# Patient Record
Sex: MALE | Race: ASIAN | NOT HISPANIC OR LATINO | Employment: FULL TIME | URBAN - METROPOLITAN AREA
[De-identification: names, ages, dates, MRNs, and addresses within clinical notes are randomized per-mention and may not be internally consistent; named-entity substitution may affect disease eponyms.]

---

## 2018-08-15 ENCOUNTER — TRANSCRIBE ORDERS (OUTPATIENT)
Dept: ADMINISTRATIVE | Facility: HOSPITAL | Age: 53
End: 2018-08-15

## 2018-08-15 DIAGNOSIS — Z01.818 PREOP TESTING: ICD-10-CM

## 2018-08-15 DIAGNOSIS — R52 PAIN: Primary | ICD-10-CM

## 2018-08-21 ENCOUNTER — HOSPITAL ENCOUNTER (OUTPATIENT)
Dept: RADIOLOGY | Facility: HOSPITAL | Age: 53
Discharge: HOME | End: 2018-08-21
Attending: SPECIALIST
Payer: COMMERCIAL

## 2018-08-21 DIAGNOSIS — R52 PAIN: ICD-10-CM

## 2018-08-21 PROCEDURE — 74177 CT ABD & PELVIS W/CONTRAST: CPT

## 2018-08-21 RX ADMIN — IOHEXOL 100 ML: 350 INJECTION, SOLUTION INTRAVENOUS at 07:42

## 2018-08-24 RX ORDER — AMLODIPINE BESYLATE 5 MG/1
5 TABLET ORAL
COMMUNITY

## 2018-08-24 NOTE — PRE-PROCEDURE INSTRUCTIONS
Pre-Surgery Instructions:   Medication Instructions    amLODIPine (NORVASC) 5 mg tablet Patient was instructed by Physician and understands   Multiple Vitamins-Minerals (CENTRUM PO) Patient was instructed by Physician and understands   Multiple Vitamins-Minerals (MENS 50+ ADVANCED PO) Patient was instructed by Physician and understands  Pt to follow Dr Minh Velásquez instructions    daughter Robina Kevin [phone]

## 2018-08-27 ENCOUNTER — ANESTHESIA EVENT (OUTPATIENT)
Dept: GASTROENTEROLOGY | Facility: AMBULARY SURGERY CENTER | Age: 53
End: 2018-08-27
Payer: COMMERCIAL

## 2018-08-28 ENCOUNTER — HOSPITAL ENCOUNTER (OUTPATIENT)
Facility: AMBULARY SURGERY CENTER | Age: 53
Setting detail: OUTPATIENT SURGERY
Discharge: HOME | End: 2018-08-28
Attending: SPECIALIST | Admitting: SPECIALIST
Payer: COMMERCIAL

## 2018-08-28 ENCOUNTER — ANESTHESIA (OUTPATIENT)
Dept: GASTROENTEROLOGY | Facility: AMBULARY SURGERY CENTER | Age: 53
End: 2018-08-28
Payer: COMMERCIAL

## 2018-08-28 VITALS
WEIGHT: 178 LBS | RESPIRATION RATE: 18 BRPM | TEMPERATURE: 98.4 F | HEART RATE: 72 BPM | OXYGEN SATURATION: 93 % | SYSTOLIC BLOOD PRESSURE: 102 MMHG | BODY MASS INDEX: 30.39 KG/M2 | DIASTOLIC BLOOD PRESSURE: 64 MMHG | HEIGHT: 64 IN

## 2018-08-28 DIAGNOSIS — K31.9 DISEASE OF STOMACH AND DUODENUM: ICD-10-CM

## 2018-08-28 DIAGNOSIS — D37.1 NEOPLASM OF UNCERTAIN BEHAVIOR OF STOMACH: ICD-10-CM

## 2018-08-28 DIAGNOSIS — K31.9 LESION OF STOMACH: Primary | ICD-10-CM

## 2018-08-28 PROCEDURE — 88342 IMHCHEM/IMCYTCHM 1ST ANTB: CPT | Performed by: PATHOLOGY

## 2018-08-28 PROCEDURE — 88341 IMHCHEM/IMCYTCHM EA ADD ANTB: CPT | Performed by: PATHOLOGY

## 2018-08-28 PROCEDURE — 88305 TISSUE EXAM BY PATHOLOGIST: CPT | Performed by: PATHOLOGY

## 2018-08-28 PROCEDURE — 88313 SPECIAL STAINS GROUP 2: CPT | Performed by: PATHOLOGY

## 2018-08-28 RX ORDER — NICOTINE 21 MG/24HR
1 PATCH, TRANSDERMAL 24 HOURS TRANSDERMAL DAILY
Qty: 28 PATCH | Refills: 0 | Status: SHIPPED | OUTPATIENT
Start: 2018-08-28

## 2018-08-28 RX ORDER — PROPOFOL 10 MG/ML
INJECTION, EMULSION INTRAVENOUS AS NEEDED
Status: DISCONTINUED | OUTPATIENT
Start: 2018-08-28 | End: 2018-08-28 | Stop reason: SURG

## 2018-08-28 RX ORDER — NICOTINE 21 MG/24HR
21 PATCH, TRANSDERMAL 24 HOURS TRANSDERMAL DAILY
Status: DISCONTINUED | OUTPATIENT
Start: 2018-08-28 | End: 2018-08-28 | Stop reason: HOSPADM

## 2018-08-28 RX ORDER — SODIUM CHLORIDE 9 MG/ML
125 INJECTION, SOLUTION INTRAVENOUS CONTINUOUS
Status: DISCONTINUED | OUTPATIENT
Start: 2018-08-28 | End: 2018-08-28 | Stop reason: HOSPADM

## 2018-08-28 RX ORDER — PANTOPRAZOLE SODIUM 40 MG/1
40 TABLET, DELAYED RELEASE ORAL DAILY
Qty: 21 TABLET | Refills: 0 | Status: SHIPPED | OUTPATIENT
Start: 2018-08-28 | End: 2021-04-08

## 2018-08-28 RX ADMIN — PROPOFOL 50 MG: 10 INJECTION, EMULSION INTRAVENOUS at 09:47

## 2018-08-28 RX ADMIN — PROPOFOL 50 MG: 10 INJECTION, EMULSION INTRAVENOUS at 10:00

## 2018-08-28 RX ADMIN — PROPOFOL 50 MG: 10 INJECTION, EMULSION INTRAVENOUS at 09:43

## 2018-08-28 RX ADMIN — PROPOFOL 20 MG: 10 INJECTION, EMULSION INTRAVENOUS at 09:44

## 2018-08-28 RX ADMIN — PROPOFOL 50 MG: 10 INJECTION, EMULSION INTRAVENOUS at 09:50

## 2018-08-28 RX ADMIN — PROPOFOL 50 MG: 10 INJECTION, EMULSION INTRAVENOUS at 09:55

## 2018-08-28 RX ADMIN — LIDOCAINE HYDROCHLORIDE 80 MG: 20 INJECTION, SOLUTION INTRAVENOUS at 09:42

## 2018-08-28 RX ADMIN — PROPOFOL 100 MG: 10 INJECTION, EMULSION INTRAVENOUS at 09:42

## 2018-08-28 RX ADMIN — SODIUM CHLORIDE 125 ML/HR: 0.9 INJECTION, SOLUTION INTRAVENOUS at 09:14

## 2018-08-28 RX ADMIN — PROPOFOL 50 MG: 10 INJECTION, EMULSION INTRAVENOUS at 10:08

## 2018-08-28 NOTE — DISCHARGE INSTRUCTIONS
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]     * Peptic ulcer disease [K27 9]     * Hiatal hernia [K44 9]  Procedure(s):  ESOPHAGOGASTRODUODENOSCOPY (EGD)  Surgeon(s):  Laura Telles MD    1  Diet:  · Begin with liquids and light food (Jell-O, soup, etc) Progress slowly to your normal diet  No alcoholic beverages for 24 hours  · Resume your normal diet  2  Medications:  · Home medications reviewed  Resume pre-operative medications unless noted below  · Prescription sent home with patient and Prescription sent over to pharmacy  · See after visit summary for reconciled discharge medications provided to patient and family  3  Activities:  · Do NOT make important personal or business decisions for 24 hours  · Do NOT drive or operate machinery for 24 hours  · While taking pain medication, do not drive and be careful as you walk or climb stairs  · Limit your activities for 24 hours  Do not engage in sports, heavy work or heavy  lifting until your physician gives you permission  4  When to Call:       Call if fever, Nausea, vomiting, Constipation not feeling well, or bleeding per rectum        and excessive pain,  5  Follow-up Care:  · Make an appointment to see MD Bayron Rivera FACS  in 10 days for Follow up   Please Call Office   for appointment,   · Call if fever, Nausea, vomiting, Constipation not feeling well, orbleeding and excessive pain,    Laura Telles MD MS [facility]  08/28/18  10:19 AM

## 2018-08-28 NOTE — ANESTHESIA PREPROCEDURE EVALUATION
Review of Systems/Medical History  Patient summary reviewed  Chart reviewed  No history of anesthetic complications     Cardiovascular  Exercise tolerance (METS): >4,  Hypertension ,    Pulmonary       GI/Hepatic            Endo/Other     GYN       Hematology   Musculoskeletal       Neurology   Psychology           Physical Exam    Airway    Mallampati score: II  TM Distance: >3 FB  Neck ROM: full     Dental   No notable dental hx     Cardiovascular  Cardiovascular exam normal    Pulmonary  Pulmonary exam normal     Other Findings        Anesthesia Plan  ASA Score- 2     Anesthesia Type- IV sedation with anesthesia with ASA Monitors  Additional Monitors:   Airway Plan:         Plan Factors-    Induction-     Postoperative Plan-     Informed Consent- Anesthetic plan and risks discussed with patient  I personally reviewed this patient with the CRNA  Discussed and agreed on the Anesthesia Plan with the CRNA  Adelaida Myers

## 2018-08-28 NOTE — PERIOPERATIVE NURSING NOTE
Patient had a delayed discharge due to low BP additional bag of NS 500cc was given  BP up to 102/64  Patient not complaining of any dizziness  No gag reflex yet  RN instructed patient to hold off on eating and drinking for another hour  RN instructed patient to start off with small sips of water and to sit up at 90 degree angle when swallowing  RN instructed patient to call MD for any signs of fever in the next couple days  Patient agrees with instructions

## 2018-08-28 NOTE — OP NOTE
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]     * Peptic ulcer disease [K27 9]     * Hiatal hernia [K44 9]  Patient Active Problem List   Diagnosis    Lesion of stomach     PMH:  Past Medical History:   Diagnosis Date    Mass of abdomen     right       Procedure(s):  ESOPHAGOGASTRODUODENOSCOPY (EGD)    Surgeon(s):  Sherryle Cipro, MD    Pre-Procedure Physical:  The following portions of the patient's history were reviewed and updated as appropriate: allergies, current medications, past family history, past medical history, past social history, past surgical history and problem list     /74   Pulse 87   Temp 98 4 °F (36 9 °C) (Tympanic)   Resp 18   Ht 5' 4" (1 626 m)   Wt 80 7 kg (178 lb)   SpO2 95%   BMI 30 55 kg/m²     Airway:  normal  Heart:  normal S1 and S2  Lungs:  clear  Abdomen:  soft, nontender, normal bowel sounds  Mental Status:  awake and alert; oriented to person, place, and time    Procedure:   Endoscopic Gastroduodenoscopy --diagnostic stomach mass on the CT scan of abdominal pelvis    Indications: New-onset dyspepsia   Stomach mass    Sedation: Monitored anesthesia care    Staff:   Circulator: Usha Whitmore RN  Scrub Person: Pamella Hatchet    Operative Findings: Body of stomach some ulceration      OPERATIVE TECHNIQUE    Fernando Beasley was identified by me  Proper detailed consent was obtained from patient risk and benefits were explained to patient and family in detail prior to coming to Operating Room  Site was marked        Malik Reason was given pre-operative antibiotics  Body mass index is 30 55 kg/m²  Malik Reason is ASA 2 and Fire risk- 2  Malik Reason was re-identified in the OR  Site was identified and detailed timeout was performed with Anesthesia and OR staff  During this procedure anesthesia was given by the anesthesiologist and during this procedure patient was  monitored continuously with ECG tracing, pulse oximetry, blood pressure monitoring, and direct observation  Malik Reason was placed in the left lateral position after induction of anesthesia mouth block was placed well-lubricated fiberoptic Olympus the gastro-endoscopy was introduced under direct vision through the pharynx to esophagus  Under direct vision scope was then advanced through the esophagus to the stomach multiple pictures were taken pylorus was identified which was opened patulous  Redness and ulcer was noted around the body of the stomach  The scope was then advanced to the second part of the duodenum It was unremarkable  Multiple biopsies were taken from the ulcer site  All the air was sucked out and the scope was then withdrawn into the stomach the rest of the stomach was fully visualized scope was then retroflexed and fundus of the stomach was visualized GE junction was inspected a small hiatal hernia was noted    Biopsy of antral was taken  for pathology and the H  Pylori  All the air was sucked out and scope was then withdrawn gastroesophageal junction was visualized which appeared normal multiple pictures were taken esophagus was then inspected for any Schatzki's ring or abnormal peristalsis   Scope was gradually withdrawn all the air was sucked out    Fernando Beasley tolerated procedure well and remained stable during and after procedure   I was present for the entire procedure    Patient Disposition:  PACU           Sherryle Cipro, MD MS [facility]    Date: 8/28/2018  Time: 10:13 AM    Copy to PCP: Nancy Fuentes MD

## 2018-09-13 ENCOUNTER — OFFICE VISIT (OUTPATIENT)
Dept: LAB | Facility: HOSPITAL | Age: 53
End: 2018-09-13
Attending: SPECIALIST
Payer: COMMERCIAL

## 2018-09-13 ENCOUNTER — APPOINTMENT (OUTPATIENT)
Dept: PREADMISSION TESTING | Facility: HOSPITAL | Age: 53
End: 2018-09-13
Payer: COMMERCIAL

## 2018-09-13 DIAGNOSIS — Z01.818 PREOP TESTING: ICD-10-CM

## 2018-09-13 PROCEDURE — 93005 ELECTROCARDIOGRAM TRACING: CPT

## 2018-09-13 RX ORDER — CLARITHROMYCIN 500 MG/1
500 TABLET, COATED ORAL EVERY 12 HOURS SCHEDULED
COMMUNITY
End: 2022-02-10 | Stop reason: ALTCHOICE

## 2018-09-13 RX ORDER — METRONIDAZOLE 500 MG/1
500 TABLET ORAL EVERY 12 HOURS SCHEDULED
COMMUNITY

## 2018-09-13 RX ORDER — AMOXICILLIN 500 MG/1
500 TABLET, FILM COATED ORAL 2 TIMES DAILY
COMMUNITY
End: 2022-02-10 | Stop reason: ALTCHOICE

## 2018-09-13 NOTE — PRE-PROCEDURE INSTRUCTIONS
My Surgical Experience    The following information was developed to assist you to prepare for your operation  What do I need to do before coming to the hospital?   Arrange for a responsible person to drive you to and from the hospital    Arrange care for your children at home  Children are not allowed in the recovery areas of the hospital   Plan to wear clothing that is easy to put on and take off  If you are having shoulder surgery, wear a shirt that buttons or zippers in the front  Bathing  o Shower the evening before and the morning of your surgery with an antibacterial soap  Please refer to the Pre Op Showering Instructions for Surgery Patients Sheet   o Remove nail polish and all body piercing jewelry  o Do not shave any body part for at least 24 hours before surgery-this includes face, arms, legs and upper body  Food  o Nothing to eat or drink after midnight the night before your surgery  This includes candy and chewing gum  o Exception: If your surgery is after 12:00pm (noon), you may have clear liquids such as 7-Up®, ginger ale, apple or cranberry juice, Jell-O®, water, or clear broth until 8:00 am  o Do not drink milk or juice with pulp on the morning before surgery  o Do not drink alcohol 24 hours before surgery  Medicine  o Follow instructions you received from your surgeon about which medicines you may take on the day of surgery  o If instructed to take medicine on the morning of surgery, take pills with just a small sip of water  Call your prescribing doctor for specific infroamtion on what to do if you take insulin    What should I bring to the hospital?    Bring:  Penjonas Olp or a walker, if you have them, for foot or knee surgery   A list of the daily medicines, vitamins, minerals, herbals and nutritional supplements you take   Include the dosages of medicines and the time you take them each day   Glasses, dentures or hearing aids   Minimal clothing; you will be wearing hospital sleepwear   Photo ID; required to verify your identity   If you have a Living Will or Power of , bring a copy of the documents   If you have an ostomy, bring an extra pouch and any supplies you use    Do not bring   Medicines or inhalers   Money, valuables or jewelry    What other information should I know about the day of surgery?  Notify your surgeons if you develop a cold, sore throat, cough, fever, rash or any other illness   Report to the Ambulatory Surgical/Same Day Surgery Unit   You will be instructed to stop at Registration only if you have not been pre-registered   Inform your  fi they do not stay that they will be asked by the staff to leave a phone number where they can be reached   Be available to be reached before surgery  In the event the operating room schedule changes, you may be asked to come in earlier or later than expected    *It is important to tell your doctor and others involved in your health care if you are taking or have been taking any non-prescription drugs, vitamins, minerals, herbals or other nutritional supplements  Any of these may interact with some food or medicines and cause a reaction      Pre-Surgery Instructions:   Medication Instructions    amLODIPine (NORVASC) 5 mg tablet Instructed patient per Anesthesia Guidelines   amoxicillin (AMOXIL) 500 MG tablet Instructed patient per Anesthesia Guidelines   clarithromycin (BIAXIN) 500 mg tablet Instructed patient per Anesthesia Guidelines   metroNIDAZOLE (FLAGYL) 500 mg tablet Instructed patient per Anesthesia Guidelines   Multiple Vitamins-Minerals (CENTRUM PO) Instructed patient per Anesthesia Guidelines   nicotine (NICODERM CQ) 21 mg/24 hr TD 24 hr patch Instructed patient per Anesthesia Guidelines   pantoprazole (PROTONIX) 40 mg tablet Instructed patient per Anesthesia Guidelines  To take   norvasc and pantoprazole a m  Of surgery

## 2018-09-14 LAB
ATRIAL RATE: 87 BPM
P AXIS: 55 DEGREES
PR INTERVAL: 152 MS
QRS AXIS: 49 DEGREES
QRSD INTERVAL: 72 MS
QT INTERVAL: 350 MS
QTC INTERVAL: 421 MS
T WAVE AXIS: 19 DEGREES
VENTRICULAR RATE: 87 BPM

## 2018-09-14 PROCEDURE — 93010 ELECTROCARDIOGRAM REPORT: CPT | Performed by: INTERNAL MEDICINE

## 2018-09-17 ENCOUNTER — ANESTHESIA EVENT (OUTPATIENT)
Dept: PERIOP | Facility: HOSPITAL | Age: 53
End: 2018-09-17
Payer: COMMERCIAL

## 2018-09-18 ENCOUNTER — HOSPITAL ENCOUNTER (OUTPATIENT)
Facility: HOSPITAL | Age: 53
Setting detail: OUTPATIENT SURGERY
Discharge: HOME | End: 2018-09-18
Attending: SPECIALIST | Admitting: SPECIALIST
Payer: COMMERCIAL

## 2018-09-18 ENCOUNTER — ANESTHESIA (OUTPATIENT)
Dept: PERIOP | Facility: HOSPITAL | Age: 53
End: 2018-09-18
Payer: COMMERCIAL

## 2018-09-18 VITALS
SYSTOLIC BLOOD PRESSURE: 111 MMHG | RESPIRATION RATE: 20 BRPM | TEMPERATURE: 98.1 F | HEART RATE: 86 BPM | DIASTOLIC BLOOD PRESSURE: 78 MMHG | OXYGEN SATURATION: 94 %

## 2018-09-18 DIAGNOSIS — R19.03 RIGHT LOWER QUADRANT ABDOMINAL SWELLING, MASS AND LUMP: ICD-10-CM

## 2018-09-18 DIAGNOSIS — D17.1 LIPOMA OF ABDOMINAL WALL: ICD-10-CM

## 2018-09-18 DIAGNOSIS — K43.2 VENTRAL INCISIONAL HERNIA: Primary | ICD-10-CM

## 2018-09-18 PROCEDURE — C1776 JOINT DEVICE (IMPLANTABLE): HCPCS | Performed by: SPECIALIST

## 2018-09-18 PROCEDURE — 88304 TISSUE EXAM BY PATHOLOGIST: CPT | Performed by: PATHOLOGY

## 2018-09-18 DEVICE — POLYPROPYLENE NON-ABSORBABLE SYNTHETIC SURGICAL MESH
Type: IMPLANTABLE DEVICE | Site: GROIN | Status: FUNCTIONAL
Brand: PROLENE

## 2018-09-18 RX ORDER — NICOTINE 21 MG/24HR
14 PATCH, TRANSDERMAL 24 HOURS TRANSDERMAL DAILY
Status: DISCONTINUED | OUTPATIENT
Start: 2018-09-18 | End: 2018-09-18 | Stop reason: HOSPADM

## 2018-09-18 RX ORDER — MAGNESIUM HYDROXIDE 1200 MG/15ML
LIQUID ORAL AS NEEDED
Status: DISCONTINUED | OUTPATIENT
Start: 2018-09-18 | End: 2018-09-18 | Stop reason: HOSPADM

## 2018-09-18 RX ORDER — MIDAZOLAM HYDROCHLORIDE 1 MG/ML
INJECTION INTRAMUSCULAR; INTRAVENOUS AS NEEDED
Status: DISCONTINUED | OUTPATIENT
Start: 2018-09-18 | End: 2018-09-18 | Stop reason: SURG

## 2018-09-18 RX ORDER — BUPIVACAINE HYDROCHLORIDE AND EPINEPHRINE 5; 5 MG/ML; UG/ML
INJECTION, SOLUTION EPIDURAL; INTRACAUDAL; PERINEURAL AS NEEDED
Status: DISCONTINUED | OUTPATIENT
Start: 2018-09-18 | End: 2018-09-18 | Stop reason: HOSPADM

## 2018-09-18 RX ORDER — OXYCODONE HYDROCHLORIDE AND ACETAMINOPHEN 5; 325 MG/1; MG/1
1 TABLET ORAL EVERY 4 HOURS PRN
Qty: 16 TABLET | Refills: 0 | Status: SHIPPED | OUTPATIENT
Start: 2018-09-18 | End: 2018-09-22

## 2018-09-18 RX ORDER — ONDANSETRON 2 MG/ML
INJECTION INTRAMUSCULAR; INTRAVENOUS AS NEEDED
Status: DISCONTINUED | OUTPATIENT
Start: 2018-09-18 | End: 2018-09-18 | Stop reason: SURG

## 2018-09-18 RX ORDER — LIDOCAINE HYDROCHLORIDE 10 MG/ML
INJECTION, SOLUTION INFILTRATION; PERINEURAL AS NEEDED
Status: DISCONTINUED | OUTPATIENT
Start: 2018-09-18 | End: 2018-09-18 | Stop reason: SURG

## 2018-09-18 RX ORDER — ONDANSETRON 2 MG/ML
4 INJECTION INTRAMUSCULAR; INTRAVENOUS ONCE AS NEEDED
Status: DISCONTINUED | OUTPATIENT
Start: 2018-09-18 | End: 2018-09-18 | Stop reason: HOSPADM

## 2018-09-18 RX ORDER — PROPOFOL 10 MG/ML
INJECTION, EMULSION INTRAVENOUS AS NEEDED
Status: DISCONTINUED | OUTPATIENT
Start: 2018-09-18 | End: 2018-09-18 | Stop reason: SURG

## 2018-09-18 RX ORDER — FENTANYL CITRATE 50 UG/ML
INJECTION, SOLUTION INTRAMUSCULAR; INTRAVENOUS AS NEEDED
Status: DISCONTINUED | OUTPATIENT
Start: 2018-09-18 | End: 2018-09-18 | Stop reason: SURG

## 2018-09-18 RX ORDER — ROCURONIUM BROMIDE 10 MG/ML
INJECTION, SOLUTION INTRAVENOUS AS NEEDED
Status: DISCONTINUED | OUTPATIENT
Start: 2018-09-18 | End: 2018-09-18 | Stop reason: SURG

## 2018-09-18 RX ORDER — SODIUM CHLORIDE, SODIUM LACTATE, POTASSIUM CHLORIDE, CALCIUM CHLORIDE 600; 310; 30; 20 MG/100ML; MG/100ML; MG/100ML; MG/100ML
75 INJECTION, SOLUTION INTRAVENOUS CONTINUOUS
Status: DISCONTINUED | OUTPATIENT
Start: 2018-09-18 | End: 2018-09-18 | Stop reason: SDUPTHER

## 2018-09-18 RX ORDER — EPHEDRINE SULFATE 50 MG/ML
INJECTION, SOLUTION INTRAVENOUS AS NEEDED
Status: DISCONTINUED | OUTPATIENT
Start: 2018-09-18 | End: 2018-09-18 | Stop reason: SURG

## 2018-09-18 RX ORDER — SODIUM CHLORIDE, SODIUM LACTATE, POTASSIUM CHLORIDE, CALCIUM CHLORIDE 600; 310; 30; 20 MG/100ML; MG/100ML; MG/100ML; MG/100ML
75 INJECTION, SOLUTION INTRAVENOUS CONTINUOUS
Status: DISCONTINUED | OUTPATIENT
Start: 2018-09-18 | End: 2018-09-18 | Stop reason: HOSPADM

## 2018-09-18 RX ORDER — DEXAMETHASONE SODIUM PHOSPHATE 4 MG/ML
INJECTION, SOLUTION INTRA-ARTICULAR; INTRALESIONAL; INTRAMUSCULAR; INTRAVENOUS; SOFT TISSUE AS NEEDED
Status: DISCONTINUED | OUTPATIENT
Start: 2018-09-18 | End: 2018-09-18 | Stop reason: SURG

## 2018-09-18 RX ORDER — FENTANYL CITRATE/PF 50 MCG/ML
25 SYRINGE (ML) INJECTION
Status: DISCONTINUED | OUTPATIENT
Start: 2018-09-18 | End: 2018-09-18 | Stop reason: HOSPADM

## 2018-09-18 RX ORDER — GLYCOPYRROLATE 0.2 MG/ML
INJECTION INTRAMUSCULAR; INTRAVENOUS AS NEEDED
Status: DISCONTINUED | OUTPATIENT
Start: 2018-09-18 | End: 2018-09-18 | Stop reason: SURG

## 2018-09-18 RX ORDER — OXYCODONE HYDROCHLORIDE AND ACETAMINOPHEN 5; 325 MG/1; MG/1
1 TABLET ORAL EVERY 4 HOURS PRN
Status: DISCONTINUED | OUTPATIENT
Start: 2018-09-18 | End: 2018-09-18 | Stop reason: HOSPADM

## 2018-09-18 RX ADMIN — FENTANYL CITRATE 25 MCG: 50 INJECTION, SOLUTION INTRAMUSCULAR; INTRAVENOUS at 12:40

## 2018-09-18 RX ADMIN — GLYCOPYRROLATE 0.4 MG: 0.2 INJECTION, SOLUTION INTRAMUSCULAR; INTRAVENOUS at 11:25

## 2018-09-18 RX ADMIN — MIDAZOLAM HYDROCHLORIDE 2 MG: 1 INJECTION, SOLUTION INTRAMUSCULAR; INTRAVENOUS at 10:22

## 2018-09-18 RX ADMIN — OXYCODONE HYDROCHLORIDE AND ACETAMINOPHEN 1 TABLET: 5; 325 TABLET ORAL at 12:55

## 2018-09-18 RX ADMIN — SODIUM CHLORIDE, SODIUM LACTATE, POTASSIUM CHLORIDE, AND CALCIUM CHLORIDE: .6; .31; .03; .02 INJECTION, SOLUTION INTRAVENOUS at 10:00

## 2018-09-18 RX ADMIN — ROCURONIUM BROMIDE 40 MG: 10 INJECTION INTRAVENOUS at 10:30

## 2018-09-18 RX ADMIN — PROPOFOL 150 MG: 10 INJECTION, EMULSION INTRAVENOUS at 10:30

## 2018-09-18 RX ADMIN — SODIUM CHLORIDE, SODIUM LACTATE, POTASSIUM CHLORIDE, AND CALCIUM CHLORIDE 75 ML/HR: .6; .31; .03; .02 INJECTION, SOLUTION INTRAVENOUS at 09:20

## 2018-09-18 RX ADMIN — DEXAMETHASONE SODIUM PHOSPHATE 8 MG: 4 INJECTION, SOLUTION INTRA-ARTICULAR; INTRALESIONAL; INTRAMUSCULAR; INTRAVENOUS; SOFT TISSUE at 10:48

## 2018-09-18 RX ADMIN — EPHEDRINE SULFATE 15 MG: 50 INJECTION, SOLUTION INTRAMUSCULAR; INTRAVENOUS; SUBCUTANEOUS at 10:40

## 2018-09-18 RX ADMIN — PROPOFOL 50 MG: 10 INJECTION, EMULSION INTRAVENOUS at 11:20

## 2018-09-18 RX ADMIN — LIDOCAINE HYDROCHLORIDE 50 MG: 10 INJECTION, SOLUTION INFILTRATION; PERINEURAL at 10:30

## 2018-09-18 RX ADMIN — PROPOFOL 50 MG: 10 INJECTION, EMULSION INTRAVENOUS at 10:55

## 2018-09-18 RX ADMIN — NEOSTIGMINE METHYLSULFATE 3 MG: 1 INJECTION, SOLUTION INTRAMUSCULAR; INTRAVENOUS; SUBCUTANEOUS at 11:25

## 2018-09-18 RX ADMIN — ONDANSETRON 4 MG: 2 INJECTION INTRAMUSCULAR; INTRAVENOUS at 10:48

## 2018-09-18 RX ADMIN — CEFAZOLIN SODIUM 2000 MG: 2 SOLUTION INTRAVENOUS at 10:27

## 2018-09-18 RX ADMIN — FENTANYL CITRATE 100 MCG: 50 INJECTION, SOLUTION INTRAMUSCULAR; INTRAVENOUS at 10:30

## 2018-09-18 RX ADMIN — FENTANYL CITRATE 25 MCG: 50 INJECTION, SOLUTION INTRAMUSCULAR; INTRAVENOUS at 12:24

## 2018-09-18 NOTE — ANESTHESIA POSTPROCEDURE EVALUATION
Post-Op Assessment Note      CV Status:  Stable    Mental Status:  Alert and awake    Hydration Status:  Stable    PONV Controlled:  Controlled    Airway Patency:  Patent and adequate    Post Op Vitals Reviewed: Yes          Staff: CRNA           BP      Temp      Pulse     Resp      SpO2

## 2018-09-18 NOTE — ANESTHESIA PREPROCEDURE EVALUATION
Review of Systems/Medical History  Patient summary reviewed  Chart reviewed      Cardiovascular  Hypertension ,    Pulmonary  Smoker cigarette smoker  ,        GI/Hepatic    GERD ,        Negative  ROS        Endo/Other  Negative endo/other ROS      GYN       Hematology  Negative hematology ROS      Musculoskeletal  Negative musculoskeletal ROS        Neurology  Negative neurology ROS      Psychology           Physical Exam    Airway    Mallampati score: II  TM Distance: >3 FB  Neck ROM: full     Dental       Cardiovascular  Rhythm: regular, Rate: normal,     Pulmonary  Breath sounds clear to auscultation,     Other Findings        Anesthesia Plan  ASA Score- 2     Anesthesia Type- general with ASA Monitors  Additional Monitors:   Airway Plan: LMA  Plan Factors-    Induction- intravenous  Postoperative Plan- Plan for postoperative opioid use  Planned trial extubation    Informed Consent- Anesthetic plan and risks discussed with patient  I personally reviewed this patient with the CRNA  Discussed and agreed on the Anesthesia Plan with the CRNA  [place] Macias

## 2018-09-18 NOTE — ANESTHESIA POSTPROCEDURE EVALUATION
Post-Op Assessment Note      CV Status:  Stable    Mental Status:  Alert and awake    Hydration Status:  Euvolemic    PONV Controlled:  Controlled    Airway Patency:  Patent    Post Op Vitals Reviewed: Yes          Staff: Anesthesiologist           BP (P) 115/67 (09/18/18 1150)    Temp (P) 98 2 °F (36 8 °C) (09/18/18 1150)    Pulse (P) 95 (09/18/18 1150)   Resp (P) 18 (09/18/18 1150)    SpO2 (P) 98 % (09/18/18 1150)

## 2018-09-18 NOTE — PERIOPERATIVE NURSING NOTE
OOB to bathroom, voided large amount clear urine without difficulty  Pain tolerable at 3/10  Criteria met for discharge  Dressing on abdomen dry and intact

## 2018-09-18 NOTE — DISCHARGE INSTRUCTIONS
Incisional Hernia   WHAT YOU NEED TO KNOW:   What is an incisional hernia? An incisional hernia is a bulge through the healed incision of a previous surgery in your abdomen  An incisional hernia is usually caused by weakness in the tissues and muscles of your abdomen  The bulge is usually caused by a part of your intestine, but it may also be tissue or fat pushing through the weakness  What increases my risk for an incisional hernia? · Older age    · Obesity or poor nutrition    · Pregnancy    · Previous infection at the incision site    · Smoking    · Straining during bowel movements, coughing, or heavy lifting    · Medicines, such as steroids  What are the signs and symptoms of an incisional hernia? · Pain    · Nausea or vomiting    · Swelling or a soft bulge along your old incision    · Bloating  How is an incisional hernia diagnosed? Your healthcare provider will look and feel for a bulge in your incision  He may ask you to lie down with your legs bent  He may also ask you to cough while standing up  You may need a CT scan to show the hernia  You may be asked to drink contrast liquid to help the hernia show up better in the pictures  Tell the healthcare provider if you have ever had an allergic reaction to contrast liquid  How is an incisional hernia treated? · Medicines:      ¨ NSAIDs , such as ibuprofen, help decrease swelling, pain, and fever  This medicine is available with or without a doctor's order  NSAIDs can cause stomach bleeding or kidney problems in certain people  If you take blood thinner medicine, always ask your healthcare provider if NSAIDs are safe for you  Always read the medicine label and follow directions  ¨ Prescription pain medicine  may be given  Ask your how to take this medicine safely  · A hernia reduction  is when your healthcare provider pushes the hernia back into your body without surgery   You may be given medicine to relax your muscles to make it easier to push the bulge back in  · Surgery  may be needed to repair your hernia  Surgery may be done through a few small incisions in your abdomen or one larger incision  Mesh may be placed to strengthen your abdominal tissues  How can I help prevent another incisional hernia? · Maintain a healthy weight  Ask your healthcare provider how much you should weigh  Ask him to help you create a weight loss plan if you are overweight  Ask your healthcare provider what types of food you should eat  · Do not strain  when you have a bowel movement  Take an over-the-counter bowel movement softener and drink plenty of water  When you cough, hold a pillow against your incision to prevent strain  · Do not smoke  If you smoke, it is never too late to quit  Ask for information if you need help quitting  · Wear your support device as directed  You may need to wear a support device, such as an abdominal binder for up to 2 weeks after surgery  This helps decrease pain and the risk of fluid collecting under your skin  · Return to your usual activities as directed  Do not lift more than 10 pounds or do strenuous activity for up to 6 weeks  Call 911 for any of the following:   · You have sudden trouble breathing  When should I seek immediate care? · You have severe pain  · You have bloody bowel movements  · You stop having bowel movements or passing gas  · Your abdomen is suddenly very hard  When should I contact my healthcare provider? · You have a fever  · You have nausea and are vomiting  · You are constipated  · Your hernia has returned  · You have a lump, or collection of fluid, under your skin  · You have pain that does not go away, even after you take pain medicine  · You have questions or concerns about your condition or care  CARE AGREEMENT:   You have the right to help plan your care  Learn about your health condition and how it may be treated   Discuss treatment options with your caregivers to decide what care you want to receive  You always have the right to refuse treatment  The above information is an  only  It is not intended as medical advice for individual conditions or treatments  Talk to your doctor, nurse or pharmacist before following any medical regimen to see if it is safe and effective for you  © 2017 [address] Information is for End User's use only and may not be sold, redistributed or otherwise used for commercial purposes  All illustrations and images included in CareNotes® are the copyrighted property of [employer]  or [employer]  Lipoma   WHAT YOU SHOULD KNOW:   A lipoma is a lump made up of fat cells  It is most often found just under your skin on your shoulders, back, or neck, but can be found in other areas of your body  Multiple lipomas found in different areas of your body is called lipomatosis  Lipomas normally grow very slowly and rarely turn into cancer  INSTRUCTIONS:   Follow up with your primary healthcare provider as directed: If you had your lipoma removed, you may need to return to have your wound checked or stitches removed  Write down your questions so you remember to ask them during your visits  Contact your primary healthcare provider if:   · You have blood in your bowel movement  · Your lipoma increases in size  · Your lipoma is painful or you have pain in the area of your lipoma  · You have questions or concerns about your condition or care  Return to the emergency department if:   · You feel a lump in your throat or have trouble swallowing  · You suddenly have trouble breathing  © 2014 [address] is for End User's use only and may not be sold, redistributed or otherwise used for commercial purposes  All illustrations and images included in CareNotes® are the copyrighted property of [employer]  or [employer]    The above information is an  only  It is not intended as medical advice for individual conditions or treatments  Talk to your doctor, nurse or pharmacist before following any medical regimen to see if it is safe and effective for you  SAME DAY SURGERY POST-OPERATIVE INSTRUCTIONS    Please follow carefully all instructions checked below  Fernando S Gale  Pre-op Diagnosis:   Right lower quadrant abdominal swelling, mass and lump [R19 03]  Post-op Diagnosis:  * No post-op diagnosis entered *   Post-Op Diagnosis Codes:     * Right lower quadrant abdominal swelling, mass and lump [R19 03]  Procedure(s):  EXPLORATION OF WOUND, EXCISION OF LIPOMA R   HERNIA REPAIR OF RIGHT GROIN WITH MESH  Surgeon(s):  MD Rui Roe PA-C    1  Diet:  · Begin with liquids and light food (Jell-O, soup, etc) Progress slowly to your normal diet  No alcoholic beverages for 24 hours  · Resume your normal diet  2  Medications:  · Home medications reviewed  Resume pre-operative medications unless noted below  · Prescription sent home with patient and Prescription sent over to pharmacy  · See after visit summary for reconciled discharge medications provided to patient and family  3  Activities:  · Do NOT make important personal or business decisions for 24 hours  · Do NOT drive or operate machinery for 24 hours  · While taking pain medication, do not drive and be careful as you walk or climb stairs  · Limit your activities for 24 hours  Do not engage in sports, heavy work or heavy  lifting until your physician gives you permission  4  When to Call:       Call if fever, Nausea, vomiting, Constipation not feeling well, or bleeding per rectum        and excessive pain,  5  Follow-up Care:  · Make an appointment to see MD Bayron Roe  [facility]  in 10 days for Follow up   Please Call Office  [phone] for appointment,   · Call if fever, Nausea, vomiting, Constipation not feeling well, orbleeding and excessive pain,    Tejal Calzada MD MS FRCS FACS  09/18/18  11:31 AM

## 2018-09-21 NOTE — OP NOTE
General SurgeryEXPLORATION OF WOUND, EXCISION OF LIPOMA R   HERNIA REPAIR OF RIGHT GROIN WITH MESH Op Note    Lea Lovett  9/18/2018    Pre-op Diagnosis:   Right lower quadrant abdominal swelling, mass and lump [R19 03]  PMH:  Past Medical History:   Diagnosis Date    Cellulitis     infection on abd x 2 mos    GERD (gastroesophageal reflux disease)     Hypertension     Mass of abdomen     right       Post-op Diagnosis:  Post-Op Diagnosis Codes:     * Right lower quadrant abdominal swelling, mass and lump [R19 03]    Patient Active Problem List   Diagnosis    Lesion of stomach    Ventral incisional hernia    Lipoma of abdominal wall    Right lower quadrant abdominal swelling, mass and lump       Procedure(s):  EXPLORATION OF WOUND, EXCISION OF LIPOMA R   HERNIA REPAIR OF RIGHT GROIN WITH MESH    Surgeon(s):  MD Anne-Marie Cota PA-C    Anesthesia:  General    Staff:   Circulator: Monse Rowley RN; Celestina Amor RN  Scrub Person: Kana Moya RN    Assistant:  Mr Vicenta Hogan PA-C  Services off [employer]UD[employer] was utilized as a first assistant as there is no surgical residency program at [facility]    Operative Findings: The small hernia direct  A small lipoma    OPERATIVE TECHNIQUE    Fernando Beasley was identified by me  Proper detailed consent was obtained from patient, The risks, benefits, alternatives,and probabilities of success were discussed in detail with no guarantee made as to outcome  All questions were answered to the patient's satisfaction  risk and benefits were explained to patient and family in detail prior to coming to Operating Room  Site was marked  Lea Lovett was given pre-operative antibiotics  There is no height or weight on file to calculate BMI  Lea Lovett is ASA 2 and Fire risk- 3  Lea Lovett was re-identified in the OR  Site was identified and detailed timeout was performed with Anesthesia and OR staff      Lea Lovett was placed in supine position  Operative area was exposed and painted with the ChloraPrep prepped and draped in the usual sterile fashion  An incision was made his previous right McBurney's incision his appendectomy scar incision was made The skin and subcutaneous tissue was cut along the line of incision with scalpel  Scar tissue was excised external oblique was identified  Superficial lipoma was noted was excised and sent for pathology    On the defect was identified at the site of for pain and tenderness and palpable lump secondary to direct inguinal hernia this area was covered with the Prolene mesh 3 x 2 cm Prolene mesh was cut which was placed under the external oblique  External beam was approximated with the 0 Vicryl Sutures  operative hemostasis was achieved the skin scar was excised completely and closed in 2 layers subcutaneous breast tissue was approximated with Vicryl   The skin was approximated with subcuticular Monocryl  Area was thoroughly infiltrated with Marcaine with epinephrine  A sterile dressing was placed    Fernando Beasley tolerated the procedure well, remain stable during and after the procedure  At the end of the procedure No active bleeding was noted and all the instruments, needle and sponge counts were noted to be correct  Sterile dresing was applied and patient was transfered to recovery area in stable condition  I was present for the entire procedure No qualified resident was available  A physician's assistant was required due to the intensity of the surgery  This no residency program in this hospital no resident was available to assist  Physician assistant was required for camera operation, hemostasis retraction and the closure of the surgical wound   Physician assistant was present during the entire procedure    Estimated Blood Loss:  Minimal    Specimens:    Order Name Source Comment Collection Info Order Time   TISSUE EXAM Soft Tissue, Lipoma  Collected By: Samia oTdd MD 9/18/2018 11:10 AM         Drains:   nil    Complications:  None    Total OR Time  1 Hr 62 Min 57 Sec   Procedure  Time  Event Time In   Procedure Start 1046   Procedure Closing 0647   Procedure Finish 9210         Stephen Lombardo MD MS Feroz Mcleod    Date: 9/21/2018  Time: 8:34 AM    Copy to PCP: Sanjuana Roberts MD

## 2019-09-10 ENCOUNTER — APPOINTMENT (OUTPATIENT)
Dept: RADIOLOGY | Facility: CLINIC | Age: 54
End: 2019-09-10

## 2019-09-10 ENCOUNTER — TRANSCRIBE ORDERS (OUTPATIENT)
Dept: URGENT CARE | Facility: CLINIC | Age: 54
End: 2019-09-10

## 2019-09-10 DIAGNOSIS — Z02.1 ENCOUNTER FOR PRE-EMPLOYMENT EXAMINATION: ICD-10-CM

## 2019-09-10 DIAGNOSIS — Z02.1 ENCOUNTER FOR PRE-EMPLOYMENT EXAMINATION: Primary | ICD-10-CM

## 2019-09-10 PROCEDURE — 71045 X-RAY EXAM CHEST 1 VIEW: CPT

## 2019-12-31 ENCOUNTER — TRANSCRIBE ORDERS (OUTPATIENT)
Dept: ADMINISTRATIVE | Facility: HOSPITAL | Age: 54
End: 2019-12-31

## 2019-12-31 DIAGNOSIS — E04.1 THYROID NODULE: Primary | ICD-10-CM

## 2020-01-03 ENCOUNTER — HOSPITAL ENCOUNTER (OUTPATIENT)
Dept: RADIOLOGY | Facility: HOSPITAL | Age: 55
Discharge: HOME | End: 2020-01-03
Attending: INTERNAL MEDICINE
Payer: COMMERCIAL

## 2020-01-03 DIAGNOSIS — E04.1 THYROID NODULE: ICD-10-CM

## 2020-01-03 PROCEDURE — 76536 US EXAM OF HEAD AND NECK: CPT

## 2020-04-08 ENCOUNTER — NURSE TRIAGE (OUTPATIENT)
Dept: OBGYN CLINIC | Facility: HOSPITAL | Age: 55
End: 2020-04-08

## 2020-04-08 DIAGNOSIS — Z20.828 SARS-ASSOCIATED CORONAVIRUS EXPOSURE: Primary | ICD-10-CM

## 2020-04-08 DIAGNOSIS — Z20.828 SARS-ASSOCIATED CORONAVIRUS EXPOSURE: ICD-10-CM

## 2020-04-08 PROCEDURE — 87635 SARS-COV-2 COVID-19 AMP PRB: CPT

## 2020-04-11 LAB — SARS-COV-2 RNA SPEC QL NAA+PROBE: DETECTED

## 2020-04-13 ENCOUNTER — TELEMEDICINE (OUTPATIENT)
Dept: FAMILY MEDICINE CLINIC | Facility: OTHER | Age: 55
End: 2020-04-13
Payer: COMMERCIAL

## 2020-04-13 DIAGNOSIS — J06.9 ACUTE RESPIRATORY DISEASE DUE TO COVID-19 VIRUS: ICD-10-CM

## 2020-04-13 DIAGNOSIS — Z20.828 EXPOSURE TO SARS-ASSOCIATED CORONAVIRUS: Primary | ICD-10-CM

## 2020-04-13 DIAGNOSIS — U07.1 ACUTE RESPIRATORY DISEASE DUE TO COVID-19 VIRUS: ICD-10-CM

## 2020-04-13 PROCEDURE — 99213 OFFICE O/P EST LOW 20 MIN: CPT | Performed by: FAMILY MEDICINE

## 2020-11-25 PROCEDURE — U0003 INFECTIOUS AGENT DETECTION BY NUCLEIC ACID (DNA OR RNA); SEVERE ACUTE RESPIRATORY SYNDROME CORONAVIRUS 2 (SARS-COV-2) (CORONAVIRUS DISEASE [COVID-19]), AMPLIFIED PROBE TECHNIQUE, MAKING USE OF HIGH THROUGHPUT TECHNOLOGIES AS DESCRIBED BY CMS-2020-01-R: HCPCS | Performed by: INTERNAL MEDICINE

## 2021-04-08 DIAGNOSIS — K31.9 LESION OF STOMACH: ICD-10-CM

## 2021-04-08 RX ORDER — PANTOPRAZOLE SODIUM 40 MG/1
TABLET, DELAYED RELEASE ORAL
Qty: 90 TABLET | Refills: 3 | Status: SHIPPED | OUTPATIENT
Start: 2021-04-08 | End: 2021-07-06

## 2021-07-03 DIAGNOSIS — K31.9 LESION OF STOMACH: ICD-10-CM

## 2021-07-06 RX ORDER — PANTOPRAZOLE SODIUM 40 MG/1
TABLET, DELAYED RELEASE ORAL
Qty: 90 TABLET | Refills: 3 | Status: SHIPPED | OUTPATIENT
Start: 2021-07-06 | End: 2021-12-16 | Stop reason: SDUPTHER

## 2021-12-16 DIAGNOSIS — K31.9 LESION OF STOMACH: ICD-10-CM

## 2021-12-17 ENCOUNTER — IMMUNIZATIONS (OUTPATIENT)
Dept: FAMILY MEDICINE CLINIC | Facility: HOSPITAL | Age: 56
End: 2021-12-17

## 2021-12-17 DIAGNOSIS — Z23 ENCOUNTER FOR IMMUNIZATION: Primary | ICD-10-CM

## 2021-12-17 PROCEDURE — 0001A COVID-19 PFIZER VACC 0.3 ML: CPT

## 2021-12-17 PROCEDURE — 91300 COVID-19 PFIZER VACC 0.3 ML: CPT

## 2021-12-17 RX ORDER — PANTOPRAZOLE SODIUM 40 MG/1
40 TABLET, DELAYED RELEASE ORAL DAILY
Qty: 90 TABLET | Refills: 2 | Status: SHIPPED | OUTPATIENT
Start: 2021-12-17

## 2022-02-10 ENCOUNTER — OFFICE VISIT (OUTPATIENT)
Dept: GASTROENTEROLOGY | Facility: CLINIC | Age: 57
End: 2022-02-10
Payer: COMMERCIAL

## 2022-02-10 VITALS
BODY MASS INDEX: 28.77 KG/M2 | HEIGHT: 66 IN | WEIGHT: 179 LBS | DIASTOLIC BLOOD PRESSURE: 88 MMHG | SYSTOLIC BLOOD PRESSURE: 138 MMHG | HEART RATE: 71 BPM

## 2022-02-10 DIAGNOSIS — Z86.010 HISTORY OF COLON POLYPS: Primary | ICD-10-CM

## 2022-02-10 DIAGNOSIS — Z72.0 NICOTINE ABUSE: ICD-10-CM

## 2022-02-10 PROCEDURE — 99214 OFFICE O/P EST MOD 30 MIN: CPT | Performed by: INTERNAL MEDICINE

## 2022-02-10 PROCEDURE — 3008F BODY MASS INDEX DOCD: CPT | Performed by: INTERNAL MEDICINE

## 2022-02-10 RX ORDER — SILDENAFIL 100 MG/1
100 TABLET, FILM COATED ORAL
COMMUNITY
Start: 2022-01-24

## 2022-02-10 NOTE — PROGRESS NOTES
[facility]patient [facility]  Yudelka Murphy 61 y o  male MRN: [id number]  Encounter: [id number]          ASSESSMENT AND PLAN:      1  History of colon polyps  Patient is doing well  He is asymptomatic  He had colonoscopy a year ago which revealed a hyperplastic polyp  He has no GI symptoms such as constipation or diarrhea  Denies any rectal bleeding or mucus per rectum  He does not have any nausea or vomiting  He came here because he was asked to come for a follow-up  He does not have any issues with his health  Recently saw his primary care specialist   There has been no change in his medications at this time  2  Nicotine abuse  He smokes about 10 cigarettes a day  We had a long discussion regarding his smoking  I have advised him to quit  He will try to do so  Adverse effects of nicotine abuse have been explained been thoroughly  He understands  I have advised him to either take maker red or contact primary care for Chantix     ______________________________________________________________________    SUBJECTIVE:  Denies any abdominal pain or discomfort  There is no nausea or vomiting  Denies any chest pain, cough or wheezing he denies any dysuria hematuria  There is no prior history of stroke, CVA or seizures  There is no significant symptom present at this time  REVIEW OF SYSTEMS IS OTHERWISE NEGATIVE  Historical Information   Past Medical History:   Diagnosis Date    Cellulitis     infection on abd x 2 mos    GERD (gastroesophageal reflux disease)     Hypertension     Mass of abdomen     right     Past Surgical History:   Procedure Laterality Date    APPENDECTOMY  2008    CYST REMOVAL      chest    MA ESOPHAGOGASTRODUODENOSCOPY TRANSORAL DIAGNOSTIC N/A 8/28/2018    Procedure: ESOPHAGOGASTRODUODENOSCOPY (EGD); Surgeon: Abbi Osullivan MD;  [facility] GI [facility];   Service: General    MA EXC TUMOR SOFT TISSUE ABDL WALL SUBFASCIAL <5CM Right 9/18/2018    Procedure: EXPLORATION OF WOUND, EXCISION OF LIPOMA R   HERNIA REPAIR OF RIGHT GROIN WITH MESH;  Surgeon: Kiesha Minor MD;  Location: [facility];  Service: General     Social History   Social History     Substance and Sexual Activity   Alcohol Use No     Social History     Substance and Sexual Activity   Drug Use No     Social History     Tobacco Use   Smoking Status Current Some Day Smoker    Packs/day: 0 50    Years: 25 00    Pack years: 12 50    Types: Cigarettes   Smokeless Tobacco Never Used     Family History   Problem Relation Age of Onset    Stroke Mother        Meds/Allergies       Current Outpatient Medications:     amLODIPine (NORVASC) 5 mg tablet    Multiple Vitamins-Minerals (MENS 50+ ADVANCED PO)    pantoprazole (PROTONIX) 40 mg tablet    metroNIDAZOLE (FLAGYL) 500 mg tablet    nicotine (NICODERM CQ) 21 mg/24 hr TD 24 hr patch    sildenafil (VIAGRA) 100 mg tablet    No Known Allergies        Objective     Blood pressure 138/88, pulse 71, height 5' 6" (1 676 m), weight 81 2 kg (179 lb)  Body mass index is 28 89 kg/m²  PHYSICAL EXAM:      General Appearance:   Alert, cooperative, no distress   HEENT:   Normocephalic, atraumatic, anicteric      Neck:  Supple, symmetrical, trachea midline   Lungs:   Clear to auscultation bilaterally; no rales, rhonchi or wheezing; respirations unlabored    Heart[de-identified]   Regular rate and rhythm; no murmur, rub, or gallop  Abdomen:   Soft, non-tender, non-distended; normal bowel sounds; no masses, no organomegaly    Genitalia:   Deferred    Rectal:   Deferred    Extremities:  No cyanosis, clubbing or edema    Pulses:  2+ and symmetric    Skin:  No jaundice, rashes, or lesions    Lymph nodes:  No palpable cervical lymphadenopathy        Lab Results:   No visits with results within 1 Day(s) from this visit     Latest known visit with results is:   Community Orders on 11/24/2020   Component Date Value    SARS-CoV-2  11/25/2020 Not Detected Radiology Results:   No results found

## 2022-09-07 DIAGNOSIS — K31.9 LESION OF STOMACH: ICD-10-CM

## 2022-09-07 RX ORDER — PANTOPRAZOLE SODIUM 40 MG/1
TABLET, DELAYED RELEASE ORAL
Qty: 90 TABLET | Refills: 2 | Status: SHIPPED | OUTPATIENT
Start: 2022-09-07

## 2022-10-04 DIAGNOSIS — N52.9 ERECTILE DYSFUNCTION, UNSPECIFIED ERECTILE DYSFUNCTION TYPE: Primary | ICD-10-CM

## 2022-10-04 RX ORDER — SILDENAFIL 100 MG/1
TABLET, FILM COATED ORAL
Qty: 12 TABLET | Refills: 0 | Status: SHIPPED | OUTPATIENT
Start: 2022-10-04

## 2022-10-13 ENCOUNTER — TELEPHONE (OUTPATIENT)
Dept: FAMILY MEDICINE CLINIC | Facility: CLINIC | Age: 57
End: 2022-10-13

## 2022-12-08 ENCOUNTER — OFFICE VISIT (OUTPATIENT)
Dept: URGENT CARE | Facility: CLINIC | Age: 57
End: 2022-12-08

## 2022-12-08 VITALS
TEMPERATURE: 96.5 F | OXYGEN SATURATION: 97 % | HEIGHT: 66 IN | RESPIRATION RATE: 16 BRPM | BODY MASS INDEX: 28.89 KG/M2 | HEART RATE: 105 BPM

## 2022-12-08 DIAGNOSIS — R05.1 ACUTE COUGH: Primary | ICD-10-CM

## 2022-12-08 NOTE — PROGRESS NOTES
[employer] [facility]        NAME: Trent Lester is a 61 y o  male  : 1965    MRN: [id number]  DATE: 2022  TIME: 11:35 AM    Assessment and Plan   Acute cough [R05 1]  1  Acute cough  Cov/Flu-Collected at [facility] or [facility]      Pt presents with symptoms consistent with possible COVID or flu  Swab performed today  Pt instructed [facility]  Discussed quarantine procedures if results are positive  Recommend Coricidin HBP over the counter for cough as patient has history of hypertension  Follow-up with PCP in 3-5 days if testing is negative and symptoms persist  Report to the emergency department if any chest pain or shortness of breath  Patient Instructions     Patient Instructions   Check or sign up for [facility] my [facility] to view your results  We do not call patient's with negative results  Go directly home after today's visit, quarantine until you receive a negative result  Isolate from others as much as possible until your result comes back  If you have a positive COVID result you need to quarantine at home for a minimum of 5 days from the date your symptoms started  You may end your quarantine when you are fever free without medications to reduce fever (e g  acetaminophen/Tylenol) for 24 hours  Anyone whom you have been in close regular contact (unmasked > 15 minute intervals) since you began having symptoms should be tested within 5-7 days of your positive test regardless of vaccination status or symptoms  Masks should be worn at all times whenever indoors until 10 days after your exposure or positive result  If you have a positive flu result you need to quarantine at home for 5 days from the date your symptoms started  You may end your quarantine when fever free and off fever lowering medications for 24 hours  Recommend masking for 5 days after return to normal activities    Anyone who may have been in close regular contact with since symptom onset only needs to be tested if they began having symptoms  Recommend over the counter antihistamines such as Claratin, Allegra, Zyrtec, or Benadryl for congestion  You may also use over the counter nasal sprays such as Flonase for this  Over the counter lozenges such as Cepacol, Ricola, Halls, or Chloroseptic can be used for sore throat symptoms  Recommend Vitamin C, Vitamin D3, multivitamin and Zinc for immune support  Discuss dosing recommendations with pharmacist especially in children and infants  If your symptoms worsen or you develop shortness of breath report to the nearest emergency room  Follow up with PCP in 3-5 days  Proceed to  ER if symptoms worsen  Chief Complaint     Chief Complaint   Patient presents with   • Headache     Pt reports headache, chills and cough  Started last night         History of Present Illness       61year old male presents with complaints of headache, cough, and myalgias since yesterday evening  Pt denies fever, chills, runny nose, sore throat,  shortness of breath, chest pain, nausea, vomiting, diarrhea, fatigue, and loss of taste and smell  He is vaccinated for COVID and flu  He has not taken anything for his symptoms thus far and reports his daughter told him to be seen to get some cough medicine  No other concerns or complaints today  Review of Systems   Review of Systems   Constitutional: Negative for chills, fatigue and fever  HENT: Positive for congestion, postnasal drip and rhinorrhea  Negative for sore throat, trouble swallowing and voice change  Respiratory: Positive for cough  Negative for shortness of breath  Cardiovascular: Negative for chest pain  Gastrointestinal: Negative for diarrhea, nausea and vomiting  Musculoskeletal: Negative for myalgias  Neurological: Negative for headaches           Current Medications       Current Outpatient Medications:   •  amLODIPine (NORVASC) 5 mg tablet, Take 5 mg by mouth daily after lunch, Disp: , Rfl:   •  metroNIDAZOLE (FLAGYL) 500 mg tablet, Take 500 mg by mouth every 12 (twelve) hours, Disp: , Rfl:   •  Multiple Vitamins-Minerals (MENS 50+ ADVANCED PO), Take by mouth daily after lunch, Disp: , Rfl:   •  nicotine (NICODERM CQ) 21 mg/24 hr TD 24 hr patch, Place 1 patch on the skin daily, Disp: 28 patch, Rfl: 0  •  pantoprazole (PROTONIX) 40 mg tablet, TAKE 1 TABLET(40 MG) BY MOUTH DAILY, Disp: 90 tablet, Rfl: 2  •  sildenafil (VIAGRA) 100 mg tablet, USE AS DIRECTED, Disp: 12 tablet, Rfl: 0    Current Allergies     Allergies as of 12/08/2022   • (No Known Allergies)            The following portions of the patient's history were reviewed and updated as appropriate: allergies, current medications, past family history, past medical history, past social history, past surgical history and problem list      Past Medical History:   Diagnosis Date   • Cellulitis     infection on abd x 2 mos   • GERD (gastroesophageal reflux disease)    • Hypertension    • Mass of abdomen     right       Past Surgical History:   Procedure Laterality Date   • APPENDECTOMY  2008   • CYST REMOVAL      chest   • AZ ESOPHAGOGASTRODUODENOSCOPY TRANSORAL DIAGNOSTIC N/A 8/28/2018    Procedure: ESOPHAGOGASTRODUODENOSCOPY (EGD); Surgeon: Carlos Dunlap MD;  Location: [facility] GI LAB; Service: General   • AZ EXC TUMOR SOFT TISSUE ABDL WALL SUBFASCIAL <5CM Right 9/18/2018    Procedure: EXPLORATION OF WOUND, EXCISION OF LIPOMA R   HERNIA REPAIR OF RIGHT GROIN WITH MESH;  Surgeon: Carlos Dunlap MD;  Location: [facility];  Service: General       Family History   Problem Relation Age of Onset   • Stroke Mother          Medications have been verified  Objective   Pulse 105   Temp (!) 96 5 °F (35 8 °C)   Resp 16   Ht 5' 6" (1 676 m)   SpO2 97%   BMI 28 89 kg/m²   No LMP for male patient  Physical Exam     Physical Exam  Vitals and nursing note reviewed  Constitutional:       General: He is awake   He is not in acute distress  Appearance: Normal appearance  He is well-developed and well-groomed  He is not ill-appearing, toxic-appearing or diaphoretic  HENT:      Head: Normocephalic and atraumatic  Jaw: No trismus  Right Ear: Hearing, tympanic membrane, ear canal and external ear normal  There is no impacted cerumen  No foreign body  Left Ear: Hearing, tympanic membrane, ear canal and external ear normal  There is no impacted cerumen  No foreign body  Nose: No nasal deformity, mucosal edema, congestion or rhinorrhea  Right Nostril: No foreign body, epistaxis or occlusion  Left Nostril: No foreign body, epistaxis or occlusion  Right Turbinates: Not enlarged, swollen or pale  Left Turbinates: Not enlarged, swollen or pale  Mouth/Throat:      Lips: Pink  No lesions  Mouth: Mucous membranes are moist  No injury, oral lesions or angioedema  Dentition: Normal dentition  Tongue: No lesions  Tongue does not deviate from midline  Palate: No mass and lesions  Pharynx: Uvula midline  Posterior oropharyngeal erythema present  No pharyngeal swelling, oropharyngeal exudate or uvula swelling  Tonsils: No tonsillar exudate or tonsillar abscesses  Eyes:      General: Lids are normal  Vision grossly intact  Gaze aligned appropriately  No allergic shiner  Extraocular Movements: Extraocular movements intact  Cardiovascular:      Rate and Rhythm: Normal rate and regular rhythm  Heart sounds: Normal heart sounds, S1 normal and S2 normal  Heart sounds not distant  No murmur heard  No friction rub  No gallop  Pulmonary:      Effort: Pulmonary effort is normal       Breath sounds: Normal breath sounds  No decreased breath sounds, wheezing, rhonchi or rales  Comments: Patient is speaking in full sentences with no increased respiratory effort  No audible wheezing or stridor  Musculoskeletal:      Cervical back: Normal range of motion  Lymphadenopathy:      Cervical: No cervical adenopathy  Skin:     General: Skin is warm and dry  Neurological:      Mental Status: He is alert and oriented to person, place, and time  Coordination: Coordination is intact  Gait: Gait is intact  Psychiatric:         Attention and Perception: Attention and perception normal          Mood and Affect: Mood and affect normal          Speech: Speech normal          Behavior: Behavior normal  Behavior is cooperative  Note: Portions of this record may have been created with voice recognition software  Occasional wrong word or "sound a like" substitutions may have occurred due to the inherent limitations of voice recognition software  Please read the chart carefully and recognize, using context, where substitutions have occurred  *

## 2022-12-08 NOTE — LETTER
[facility] [address] [address]  Dept: [phone]    December 8, 2022    Patient: Lucretia Breaux  YOB: 1965    Lucretia Breaux was seen and evaluated at our [facility]  Please note if Covid and Flu tests are negative, they may return to work when fever free for 24 hours without the use of a fever reducing agent  If Covid or Flu test is positive, they may return to work on 12/13/2022, as this is 5 days from the onset of symptoms  Upon return, they must then adhere to strict masking for an additional 5 days      Sincerely,    Alejandro Benavides PA-C

## 2022-12-08 NOTE — PATIENT INSTRUCTIONS
Check or sign up for [facility]'s my Chart to view your results  We do not call patient's with negative results  Go directly home after today's visit, quarantine until you receive a negative result  Isolate from others as much as possible until your result comes back  If you have a positive COVID result you need to quarantine at home for a minimum of 5 days from the date your symptoms started  You may end your quarantine when you are fever free without medications to reduce fever (e g  acetaminophen/Tylenol) for 24 hours  Anyone whom you have been in close regular contact (unmasked > 15 minute intervals) since you began having symptoms should be tested within 5-7 days of your positive test regardless of vaccination status or symptoms  Masks should be worn at all times whenever indoors until 10 days after your exposure or positive result  If you have a positive flu result you need to quarantine at home for 5 days from the date your symptoms started  You may end your quarantine when fever free and off fever lowering medications for 24 hours  Recommend masking for 5 days after return to normal activities  Anyone who may have been in close regular contact with since symptom onset only needs to be tested if they began having symptoms  Recommend over the counter antihistamines such as Claratin, Allegra, Zyrtec, or Benadryl for congestion  You may also use over the counter nasal sprays such as Flonase for this  Over the counter lozenges such as Cepacol, Ricola, Halls, or Chloroseptic can be used for sore throat symptoms  Recommend Vitamin C, Vitamin D3, multivitamin and Zinc for immune support  Discuss dosing recommendations with pharmacist especially in children and infants  If your symptoms worsen or you develop shortness of breath report to the nearest emergency room

## 2022-12-09 ENCOUNTER — TELEPHONE (OUTPATIENT)
Dept: URGENT CARE | Facility: CLINIC | Age: 57
End: 2022-12-09

## 2022-12-09 LAB
FLUAV RNA RESP QL NAA+PROBE: NEGATIVE
FLUBV RNA RESP QL NAA+PROBE: NEGATIVE
SARS-COV-2 RNA RESP QL NAA+PROBE: POSITIVE

## 2023-01-07 DIAGNOSIS — I10 PRIMARY HYPERTENSION: Primary | ICD-10-CM

## 2023-01-08 RX ORDER — AMLODIPINE BESYLATE 5 MG/1
TABLET ORAL
Qty: 90 TABLET | Refills: 0 | Status: SHIPPED | OUTPATIENT
Start: 2023-01-08

## 2023-01-09 DIAGNOSIS — I10 PRIMARY HYPERTENSION: ICD-10-CM

## 2023-01-09 RX ORDER — AMLODIPINE BESYLATE 5 MG/1
5 TABLET ORAL DAILY
Qty: 90 TABLET | Refills: 0 | OUTPATIENT
Start: 2023-01-09 | End: 2023-04-09

## 2023-01-16 DIAGNOSIS — N52.9 ERECTILE DYSFUNCTION, UNSPECIFIED ERECTILE DYSFUNCTION TYPE: ICD-10-CM

## 2023-01-16 RX ORDER — SILDENAFIL 100 MG/1
100 TABLET, FILM COATED ORAL DAILY PRN
Qty: 12 TABLET | Refills: 0 | Status: SHIPPED | OUTPATIENT
Start: 2023-01-16

## 2023-01-24 DIAGNOSIS — N52.9 ERECTILE DYSFUNCTION, UNSPECIFIED ERECTILE DYSFUNCTION TYPE: ICD-10-CM

## 2023-01-24 RX ORDER — SILDENAFIL 100 MG/1
100 TABLET, FILM COATED ORAL DAILY PRN
Qty: 12 TABLET | Refills: 0 | OUTPATIENT
Start: 2023-01-24

## 2023-02-06 ENCOUNTER — TELEPHONE (OUTPATIENT)
Dept: OTHER | Facility: OTHER | Age: 58
End: 2023-02-06

## 2023-02-06 ENCOUNTER — OFFICE VISIT (OUTPATIENT)
Dept: FAMILY MEDICINE CLINIC | Facility: CLINIC | Age: 58
End: 2023-02-06

## 2023-02-06 VITALS
HEART RATE: 88 BPM | BODY MASS INDEX: 32.3 KG/M2 | DIASTOLIC BLOOD PRESSURE: 76 MMHG | SYSTOLIC BLOOD PRESSURE: 124 MMHG | WEIGHT: 201 LBS | HEIGHT: 66 IN | OXYGEN SATURATION: 99 %

## 2023-02-06 DIAGNOSIS — I10 PRIMARY HYPERTENSION: Primary | ICD-10-CM

## 2023-02-06 DIAGNOSIS — N52.9 ERECTILE DYSFUNCTION, UNSPECIFIED ERECTILE DYSFUNCTION TYPE: ICD-10-CM

## 2023-02-06 DIAGNOSIS — Z13.0 SCREENING FOR DEFICIENCY ANEMIA: ICD-10-CM

## 2023-02-06 DIAGNOSIS — K31.9 LESION OF STOMACH: ICD-10-CM

## 2023-02-06 DIAGNOSIS — Z13.29 SCREENING FOR THYROID DISORDER: ICD-10-CM

## 2023-02-06 DIAGNOSIS — R73.03 PRE-DIABETES: ICD-10-CM

## 2023-02-06 DIAGNOSIS — K21.9 GASTROESOPHAGEAL REFLUX DISEASE WITHOUT ESOPHAGITIS: ICD-10-CM

## 2023-02-06 DIAGNOSIS — E78.5 DYSLIPIDEMIA: ICD-10-CM

## 2023-02-06 DIAGNOSIS — F51.01 PRIMARY INSOMNIA: ICD-10-CM

## 2023-02-06 DIAGNOSIS — Z12.5 SCREENING FOR PROSTATE CANCER: ICD-10-CM

## 2023-02-06 DIAGNOSIS — E66.09 CLASS 1 OBESITY DUE TO EXCESS CALORIES WITHOUT SERIOUS COMORBIDITY WITH BODY MASS INDEX (BMI) OF 32.0 TO 32.9 IN ADULT: ICD-10-CM

## 2023-02-06 PROBLEM — R19.03 RIGHT LOWER QUADRANT ABDOMINAL SWELLING, MASS AND LUMP: Status: RESOLVED | Noted: 2018-09-18 | Resolved: 2023-02-06

## 2023-02-06 PROBLEM — K43.2 VENTRAL INCISIONAL HERNIA: Status: RESOLVED | Noted: 2018-09-18 | Resolved: 2023-02-06

## 2023-02-06 PROBLEM — E66.811 CLASS 1 OBESITY DUE TO EXCESS CALORIES IN ADULT: Status: ACTIVE | Noted: 2023-02-06

## 2023-02-06 RX ORDER — ZOLPIDEM TARTRATE 5 MG/1
5 TABLET ORAL
Qty: 15 TABLET | Refills: 0 | Status: SHIPPED | OUTPATIENT
Start: 2023-02-06

## 2023-02-06 RX ORDER — AMLODIPINE BESYLATE 5 MG/1
5 TABLET ORAL DAILY
Qty: 90 TABLET | Refills: 0 | Status: SHIPPED | OUTPATIENT
Start: 2023-02-06

## 2023-02-06 RX ORDER — PANTOPRAZOLE SODIUM 40 MG/1
40 TABLET, DELAYED RELEASE ORAL DAILY
Qty: 90 TABLET | Refills: 2 | Status: SHIPPED | OUTPATIENT
Start: 2023-02-06

## 2023-02-06 NOTE — ASSESSMENT & PLAN NOTE
Patient is at times having difficulty sleeping in the night in the past he has taken zolpidem we will order the same for few days

## 2023-02-06 NOTE — ASSESSMENT & PLAN NOTE
Patient with BMI of 32 44 recommend very strongly to cut back on his calorie intake, carbohydrate intake diet modification lifestyle modification

## 2023-02-06 NOTE — ASSESSMENT & PLAN NOTE
Patient with GERD symptoms on pantoprazole 40 mg daily recommend patient to try it every other day and try to see if he can taper it down and possible

## 2023-02-06 NOTE — ASSESSMENT & PLAN NOTE
Patient is currently taking amlodipine 5 mg daily we will continue with the same blood pressure is stable at 124/76

## 2023-02-06 NOTE — PROGRESS NOTES
Office Visit Note  23     Aram Worley 61 y o  male MRN: [id number]  : 1965    Assessment:     1  Primary hypertension  Assessment & Plan:  Patient is currently taking amlodipine 5 mg daily we will continue with the same blood pressure is stable at 124/76      2  Class 1 obesity due to excess calories without serious comorbidity with body mass index (BMI) of 32 0 to 32 9 in adult  Assessment & Plan:  Patient with BMI of 32 44 recommend very strongly to cut back on his calorie intake, carbohydrate intake diet modification lifestyle modification  3  Erectile dysfunction, unspecified erectile dysfunction type  Assessment & Plan:  Currently takes sildenafil on a as needed basis  4  Gastroesophageal reflux disease without esophagitis  Assessment & Plan:  Patient with GERD symptoms on pantoprazole 40 mg daily recommend patient to try it every other day and try to see if he can taper it down and possible        BMI Counseling: Body mass index is 32 44 kg/m²  The BMI is above normal  Nutrition recommendations include decreasing portion sizes, decreasing fast food intake, consuming healthier snacks, moderation in carbohydrate intake and reducing intake of cholesterol  Exercise recommendations include moderate physical activity 150 minutes/week  No pharmacotherapy was ordered  Rationale for BMI follow-up plan is due to patient being overweight or obese  Depression Screening and Follow-up Plan: Patient was screened for depression during today's encounter  They screened negative with a PHQ-2 score of 0  Discussion Summary and Plan: Today's care plan and medications were reviewed with patient in detail and all their questions answered to their satisfaction  Chief Complaint   Patient presents with   • Follow-up      Subjective:  Patient is coming for evaluation regarding symptoms of hypertension    Recently he has gained weight currently is almost 201 pounds now since he stopped smoking he started to gain weight  Chest pain palpitation shortness of breath or any other associated symptoms  Medications reviewed Labs reviewed which she had 1 year ago  According to the patient he does walk a lot in the workplace  Patient at times is having difficulty sleeping in the night in the past he has taken Ambien 5 mg we will order a few tablets of the same  The following portions of the patient's history were reviewed and updated as appropriate: allergies, current medications, past family history, past medical history, past social history, past surgical history and problem list     Review of Systems   Constitutional: Negative for chills and fever  HENT: Negative for ear pain and sore throat  Eyes: Negative for pain and visual disturbance  Respiratory: Negative for cough and shortness of breath  Cardiovascular: Negative for chest pain and palpitations  Gastrointestinal: Negative for abdominal pain and vomiting  Genitourinary: Negative for dysuria and hematuria  Musculoskeletal: Negative for arthralgias and back pain  Skin: Negative for color change and rash  Neurological: Negative for seizures and syncope  All other systems reviewed and are negative  Historical Information   Patient Active Problem List   Diagnosis   • Lesion of stomach   • Lipoma of abdominal wall   • Primary hypertension   • Gastroesophageal reflux disease without esophagitis   • Erectile dysfunction   • Class 1 obesity due to excess calories in adult     Past Medical History:   Diagnosis Date   • Cellulitis     infection on abd x 2 mos   • GERD (gastroesophageal reflux disease)    • Hypertension    • Mass of abdomen     right   • Ventral incisional hernia 9/18/2018     Past Surgical History:   Procedure Laterality Date   • APPENDECTOMY  2008   • CYST REMOVAL      chest   • [place] ESOPHAGOGASTRODUODENOSCOPY TRANSORAL DIAGNOSTIC N/A 8/28/2018    Procedure: ESOPHAGOGASTRODUODENOSCOPY (EGD);   Surgeon: Hanna Justin Bret Adan MD;  Location: [facility] GI LAB; Service: General   • AL EXC TUMOR SOFT TISSUE ABDL WALL SUBFASCIAL <5CM Right 9/18/2018    Procedure: EXPLORATION OF WOUND, EXCISION OF LIPOMA R   HERNIA REPAIR OF RIGHT GROIN WITH MESH;  Surgeon: Shadi Hamm MD;  Location: [facility] OR;  Service: General     Social History     Substance and Sexual Activity   Alcohol Use No     Social History     Substance and Sexual Activity   Drug Use No     Social History     Tobacco Use   Smoking Status Some Days   • Packs/day: 0 50   • Years: 25 00   • Pack years: 12 50   • Types: Cigarettes   Smokeless Tobacco Never     Family History   Problem Relation Age of Onset   • Stroke Mother      Health Maintenance Due   Topic   • Hepatitis C Screening    • Hepatitis A Vaccine (1 of 2 - Risk 2-dose series)   • Pneumococcal Vaccine: Pediatrics (0 to 5 Years) and At-Risk Patients (6 to 59 Years) (1 - PCV)   • HIV Screening    • BMI: Followup Plan    • Annual Physical    • DTaP,Tdap,and Td Vaccines (1 - Tdap)   • COVID-19 Vaccine (2 - Pfizer series)   • BMI: Adult       Meds/Allergies       Current Outpatient Medications:   •  amLODIPine (NORVASC) 5 mg tablet, TAKE 1 TABLET BY MOUTH DAILY, Disp: 90 tablet, Rfl: 0  •  Multiple Vitamins-Minerals (MENS 50+ ADVANCED PO), Take by mouth daily after lunch, Disp: , Rfl:   •  pantoprazole (PROTONIX) 40 mg tablet, TAKE 1 TABLET(40 MG) BY MOUTH DAILY, Disp: 90 tablet, Rfl: 2  •  sildenafil (VIAGRA) 100 mg tablet, Take 1 tablet (100 mg total) by mouth daily as needed for erectile dysfunction Use as directed, Disp: 12 tablet, Rfl: 0      Objective:    Vitals:   /76 (BP Location: Right arm, Patient Position: Sitting, Cuff Size: Standard)   Pulse 88   Ht 5' 6" (1 676 m)   Wt 91 2 kg (201 lb)   SpO2 99%   BMI 32 44 kg/m²   Body mass index is 32 44 kg/m²  Vitals:    02/06/23 1520   Weight: 91 2 kg (201 lb)       Physical Exam  Vitals and nursing note reviewed     Constitutional:       Appearance: He is obese    Cardiovascular:      Rate and Rhythm: Normal rate and regular rhythm  Heart sounds: Normal heart sounds  Pulmonary:      Effort: Pulmonary effort is normal       Breath sounds: Normal breath sounds  Abdominal:      Palpations: Abdomen is soft  Musculoskeletal:      Right lower leg: No edema  Left lower leg: No edema  Neurological:      Mental Status: He is alert and oriented to person, place, and time  Lab Review   Office Visit on 12/08/2022   Component Date Value Ref Range Status   • SARS-CoV-2 12/08/2022 Positive (A)  Negative Final   • INFLUENZA A PCR 12/08/2022 Negative  Negative Final   • INFLUENZA B PCR 12/08/2022 Negative  Negative Final         Avelino Fatima MD        "This note has been constructed using a voice recognition system  Therefore there may be syntax, spelling, and/or grammatical errors   Please call if you have any questions  "

## 2023-02-11 LAB — HBA1C MFR BLD HPLC: 5.7 %

## 2023-02-27 ENCOUNTER — TELEPHONE (OUTPATIENT)
Dept: FAMILY MEDICINE CLINIC | Facility: CLINIC | Age: 58
End: 2023-02-27

## 2023-04-04 ENCOUNTER — RA CDI HCC (OUTPATIENT)
Dept: OTHER | Facility: HOSPITAL | Age: 58
End: 2023-04-04

## 2023-04-04 NOTE — PROGRESS NOTES
[facility]  coding opportunities       Chart reviewed, no opportunity found: CHART REVIEWED, NO OPPORTUNITY FOUND        Patients Insurance        Commercial Insurance: Akila Knox

## 2023-04-13 DIAGNOSIS — N52.9 ERECTILE DYSFUNCTION, UNSPECIFIED ERECTILE DYSFUNCTION TYPE: ICD-10-CM

## 2023-04-13 RX ORDER — TADALAFIL 20 MG/1
20 TABLET ORAL DAILY PRN
Qty: 12 TABLET | Refills: 0 | Status: SHIPPED | OUTPATIENT
Start: 2023-04-13 | End: 2023-07-12 | Stop reason: SDUPTHER

## 2023-05-03 ENCOUNTER — OFFICE VISIT (OUTPATIENT)
Dept: GASTROENTEROLOGY | Facility: CLINIC | Age: 58
End: 2023-05-03

## 2023-05-03 VITALS
HEART RATE: 76 BPM | SYSTOLIC BLOOD PRESSURE: 145 MMHG | BODY MASS INDEX: 32.92 KG/M2 | WEIGHT: 204.8 LBS | DIASTOLIC BLOOD PRESSURE: 95 MMHG | HEIGHT: 66 IN

## 2023-05-03 DIAGNOSIS — K21.9 GASTROESOPHAGEAL REFLUX DISEASE WITHOUT ESOPHAGITIS: Primary | ICD-10-CM

## 2023-05-03 DIAGNOSIS — E66.9 OBESITY (BMI 30.0-34.9): ICD-10-CM

## 2023-05-03 DIAGNOSIS — D17.1 LIPOMA OF ABDOMINAL WALL: ICD-10-CM

## 2023-05-03 DIAGNOSIS — Z86.010 HISTORY OF COLON POLYPS: ICD-10-CM

## 2023-05-03 NOTE — PROGRESS NOTES
[facility] - Outpatient Follow-up Note  Refugia Spikes 61 y o  male MRN: [id number]  Encounter: [id number]          ASSESSMENT AND PLAN:      1  Gastroesophageal reflux disease without esophagitis  Patient has longstanding history of gastroesophageal reflux disease  Recently he has gained weight after he quit smoking  His reflux symptoms are present however he is not bothered too much by it  There is no dysphagia odynophagia  There is no abdominal pain  He is able to sleep well  We had a long discussion regarding side effects of proton pump inhibitor  It may be beneficial to cut down the dose of pantoprazole to 20 mg a day  Dietary instructions given  If the symptoms persist then patient would need upper endoscopy  I have advised the patient to notify me if his symptoms is worse  Currently patient is not agreeable for endoscopy  2  History of colon polyps  He has history of hyperplastic colon polyp  His colonoscopy is not due at this time  3  Lipoma of abdominal wall  History of lipoma  4  Obesity (BMI 30 0-34  9)  Recently he has gained significant weight after he quit smoking  I have advised patient to cut down the carbohydrate intake  He will increase his intake of fish, chicken and vegetables  He will also cut down red meat     ______________________________________________________________________    SUBJECTIVE: Overall stable  There is no heartburn or indigestion  There is no nausea or vomiting  Denies any dysphagia odynophagia  There is no abdominal pain  Long discussion regarding proton pump inhibitor  I have also advised patient that he should get a upper endoscopy done for evaluation of long-term acid reflux  He will think about it  If his symptoms persist he will definitely call me for endoscopy  I have told the patient to notify me through [address]  REVIEW OF SYSTEMS IS OTHERWISE NEGATIVE        Historical Information   Past Medical History: "  Diagnosis Date    Cellulitis     infection on abd x 2 mos    GERD (gastroesophageal reflux disease)     Hypertension     Mass of abdomen     right    Ventral incisional hernia 9/18/2018     Past Surgical History:   Procedure Laterality Date    APPENDECTOMY  2008    CYST REMOVAL      chest    [place] ESOPHAGOGASTRODUODENOSCOPY TRANSORAL DIAGNOSTIC N/A 8/28/2018    Procedure: ESOPHAGOGASTRODUODENOSCOPY (EGD); Surgeon: Jim Palomares MD;  Location: [facility] GI LAB; Service: General    ID EXC TUMOR SOFT TISSUE ABDL WALL SUBFASCIAL <5CM Right 9/18/2018    Procedure: EXPLORATION OF WOUND, EXCISION OF LIPOMA R   HERNIA REPAIR OF RIGHT GROIN WITH MESH;  Surgeon: Jim Palomares MD;  Location: [facility] OR;  Service: General     Social History   Social History     Substance and Sexual Activity   Alcohol Use No     Social History     Substance and Sexual Activity   Drug Use No     Social History     Tobacco Use   Smoking Status Some Days    Packs/day: 0 50    Years: 25 00    Pack years: 12 50    Types: Cigarettes   Smokeless Tobacco Never     Family History   Problem Relation Age of Onset    Stroke Mother        Meds/Allergies       Current Outpatient Medications:     amLODIPine (NORVASC) 5 mg tablet    Multiple Vitamins-Minerals (MENS 50+ ADVANCED PO)    pantoprazole (PROTONIX) 40 mg tablet    tadalafil (CIALIS) 20 MG tablet    zolpidem (AMBIEN) 5 mg tablet    No Known Allergies        Objective     Blood pressure 145/95, pulse 76, height 5' 6\" (1 676 m), weight 92 9 kg (204 lb 12 8 oz)  Body mass index is 33 06 kg/m²  PHYSICAL EXAM:      General Appearance:   Alert, cooperative, no distress   HEENT:   Normocephalic, atraumatic, anicteric      Neck:  Supple, symmetrical, trachea midline   Lungs:   Clear to auscultation bilaterally; no rales, rhonchi or wheezing; respirations unlabored    Heart[de-identified]   Regular rate and rhythm; no murmur, rub, or gallop     Abdomen:   Soft, non-tender, non-distended; normal bowel " sounds; no masses, no organomegaly    Genitalia:   Deferred    Rectal:   Deferred    Extremities:  No cyanosis, clubbing or edema    Pulses:  2+ and symmetric    Skin:  No jaundice, rashes, or lesions    Lymph nodes:  No palpable cervical lymphadenopathy        Lab Results:   No visits with results within 1 Day(s) from this visit  Latest known visit with results is:   Orders Only on 02/11/2023   Component Date Value    Hemoglobin A1C 02/11/2023 5 7          Radiology Results:   No results found  Answers for HPI/ROS submitted by the patient on 5/1/2023  Progression since onset: resolved  anorexia: No  arthralgias: No  belching: No  constipation: No  diarrhea: No  dysuria: No  fever: No  flatus: No  frequency: No  headaches: Yes  hematochezia: No  hematuria: No  melena: No  myalgias: Yes  nausea:  No  weight loss: No  vomiting: No  Aggravated by: nothing

## 2023-05-31 ENCOUNTER — HOSPITAL ENCOUNTER (OUTPATIENT)
Dept: RADIOLOGY | Facility: HOSPITAL | Age: 58
Discharge: HOME | End: 2023-05-31
Attending: PODIATRIST
Payer: COMMERCIAL

## 2023-05-31 ENCOUNTER — APPOINTMENT (OUTPATIENT)
Dept: RADIOLOGY | Facility: CLINIC | Age: 58
End: 2023-05-31

## 2023-05-31 ENCOUNTER — OFFICE VISIT (OUTPATIENT)
Dept: PODIATRY | Facility: CLINIC | Age: 58
End: 2023-05-31

## 2023-05-31 ENCOUNTER — OFFICE VISIT (OUTPATIENT)
Dept: URGENT CARE | Facility: CLINIC | Age: 58
End: 2023-05-31

## 2023-05-31 VITALS
HEIGHT: 66 IN | HEART RATE: 65 BPM | SYSTOLIC BLOOD PRESSURE: 169 MMHG | DIASTOLIC BLOOD PRESSURE: 109 MMHG | OXYGEN SATURATION: 98 % | BODY MASS INDEX: 32.93 KG/M2

## 2023-05-31 VITALS
DIASTOLIC BLOOD PRESSURE: 84 MMHG | TEMPERATURE: 97.5 F | HEART RATE: 68 BPM | RESPIRATION RATE: 18 BRPM | OXYGEN SATURATION: 98 % | WEIGHT: 204 LBS | SYSTOLIC BLOOD PRESSURE: 177 MMHG | HEIGHT: 66 IN | BODY MASS INDEX: 32.78 KG/M2

## 2023-05-31 DIAGNOSIS — S93.335A: Primary | ICD-10-CM

## 2023-05-31 DIAGNOSIS — M79.672 LEFT FOOT PAIN: ICD-10-CM

## 2023-05-31 DIAGNOSIS — S93.335A: ICD-10-CM

## 2023-05-31 PROCEDURE — 73630 X-RAY EXAM OF FOOT: CPT

## 2023-05-31 RX ORDER — IBUPROFEN 600 MG/1
600 TABLET ORAL EVERY 8 HOURS PRN
Qty: 30 TABLET | Refills: 2 | Status: SHIPPED | OUTPATIENT
Start: 2023-05-31

## 2023-05-31 RX ORDER — OXYCODONE HYDROCHLORIDE AND ACETAMINOPHEN 5; 325 MG/1; MG/1
1 TABLET ORAL EVERY 8 HOURS PRN
Qty: 6 TABLET | Refills: 0 | Status: SHIPPED | OUTPATIENT
Start: 2023-05-31

## 2023-05-31 NOTE — PROGRESS NOTES
Assessment/Plan:    X-rays of the patient's left foot were personally reviewed and interpreted  Findings were consistent with dorsal dislocation of the left great toe at the level of the first MTPJ and disruption of the sesamoid apparatus  On clinical examination, there is mild edema to the patient's left first MTPJ with shortening of the first ray  There is tenderness palpation along the base of the left hallux  There is absence of motion of the first MTPJ secondary to this location  Neurovascular status is intact  Ecchymosis is noted to the plantar aspect of the first MTPJ where there is disruption of the sesamoid complex  An attempt at closed reduction was made after a Dao block about the patient's left first ray  A total of 20 cc of a one-to-one mix of 1% Xylocaine plain and 0 25% bupivacaine plain was utilized  Once anesthesia was achieved, the deformity was then distracted and the left hallux was successfully relocated back in its place  Repeat x-rays of the left foot showed good placement of the joint as well as good positioning of the sesamoid apparatus  The patient was placed in a low tide Cam boot to start guarded weightbearing to the left foot with crutches  Once his pain improves, he may progress to guarded weightbearing with just the boot and no crutches  The patient is requesting pain medication  I did send over prescription for ibuprofen 600 mg to be taken 3 times a day for left foot pain and swelling  I also sent over a small supply of Percocet 5 325 mg, 6 tablets, 1 tablet by mouth every 8 hours as needed for severe pain only  A work note was provided for the patient to keep him out of work until June 12, 2023  He will follow-up with me the week prior for follow-up and repeat x-rays  Diagnoses and all orders for this visit:    Dislocation of metatarsal joint of left foot  -     Ambulatory Referral to Podiatry  -     X-ray foot left 3+ views;  Future  -     Cam Boot  - ibuprofen (MOTRIN) 600 mg tablet; Take 1 tablet (600 mg total) by mouth every 8 (eight) hours as needed for mild pain  -     oxyCODONE-acetaminophen (Percocet) 5-325 mg per tablet; Take 1 tablet by mouth every 8 (eight) hours as needed for severe pain Max Daily Amount: 3 tablets    Left foot pain  -     oxyCODONE-acetaminophen (Percocet) 5-325 mg per tablet; Take 1 tablet by mouth every 8 (eight) hours as needed for severe pain Max Daily Amount: 3 tablets          Subjective:      Patient ID: Rashad Gallegos is a 61 y o  male  The patient presents today for his initial consultation was in express care with a chief complaint of a dislocated left great toe  The injury was sustained earlier this morning when he was carrying a 50 pound container of chemicals for a pool  The container broke causing the patient to lose balance  He fell forward and landed on his great toe with his full weight causing pain and subsequent dislocation which was identified when he visited his local urgent care center and x-rays were taken  At present, he notes significant pain to his left great toe joint and was discharged from urgent care with a set of crutches  His wife is present in the exam room  The following portions of the patient's history were reviewed and updated as appropriate: allergies, current medications, past family history, past medical history, past social history, past surgical history and problem list       PAST MEDICAL HISTORY:  Past Medical History:   Diagnosis Date   • Cellulitis     infection on abd x 2 mos   • GERD (gastroesophageal reflux disease)    • Hypertension    • Mass of abdomen     right   • Ventral incisional hernia 9/18/2018       PAST SURGICAL HISTORY:  Past Surgical History:   Procedure Laterality Date   • APPENDECTOMY  2008   • CYST REMOVAL      chest   • [place] ESOPHAGOGASTRODUODENOSCOPY TRANSORAL DIAGNOSTIC N/A 8/28/2018    Procedure: ESOPHAGOGASTRODUODENOSCOPY (EGD);   Surgeon: Swapnil Jennings Rozina Call MD;  Location: [facility] GI LAB; Service: General   • [place] EXC TUMOR SOFT TISSUE ABDL WALL SUBFASCIAL <5CM Right 9/18/2018    Procedure: EXPLORATION OF WOUND, EXCISION OF LIPOMA R   HERNIA REPAIR OF RIGHT GROIN WITH MESH;  Surgeon: Lauren Conde MD;  Location: [facility];  Service: General        ALLERGIES:  Patient has no known allergies  MEDICATIONS:  Current Outpatient Medications   Medication Sig Dispense Refill   • amLODIPine (NORVASC) 5 mg tablet Take 1 tablet (5 mg total) by mouth daily 90 tablet 0   • ibuprofen (MOTRIN) 600 mg tablet Take 1 tablet (600 mg total) by mouth every 8 (eight) hours as needed for mild pain 30 tablet 2   • Multiple Vitamins-Minerals (MENS 50+ ADVANCED PO) Take by mouth daily after lunch     • oxyCODONE-acetaminophen (Percocet) 5-325 mg per tablet Take 1 tablet by mouth every 8 (eight) hours as needed for severe pain Max Daily Amount: 3 tablets 6 tablet 0   • pantoprazole (PROTONIX) 40 mg tablet Take 1 tablet (40 mg total) by mouth daily 90 tablet 2   • tadalafil (CIALIS) 20 MG tablet Take 1 tablet (20 mg total) by mouth daily as needed for erectile dysfunction 12 tablet 0   • zolpidem (AMBIEN) 5 mg tablet Take 1 tablet (5 mg total) by mouth daily at bedtime as needed for sleep 15 tablet 0     No current facility-administered medications for this visit         SOCIAL HISTORY:  Social History     Socioeconomic History   • Marital status: Single     Spouse name: None   • Number of children: None   • Years of education: None   • Highest education level: None   Occupational History   • None   Tobacco Use   • Smoking status: Some Days     Packs/day: 0 50     Years: 25 00     Total pack years: 12 50     Types: Cigarettes   • Smokeless tobacco: Never   Substance and Sexual Activity   • Alcohol use: No   • Drug use: No   • Sexual activity: None   Other Topics Concern   • None   Social History Narrative   • None     Social Determinants of Health     Financial Resource Strain: Not on "file   Food Insecurity: Not on file   Transportation Needs: Not on file   Physical Activity: Not on file   Stress: Not on file   Social Connections: Not on file   Intimate Partner Violence: Not on file   Housing Stability: Not on file        Review of Systems   Constitutional: Negative  HENT: Negative  Eyes: Negative  Respiratory: Negative  Cardiovascular: Negative  Endocrine: Negative  Musculoskeletal: Negative  Neurological: Negative  Hematological: Negative  Psychiatric/Behavioral: Negative  Objective:      BP (!) 169/109 (BP Location: Right arm, Patient Position: Sitting, Cuff Size: Standard)   Pulse 65   Ht 5' 6\" (1 676 m)   SpO2 98%   BMI 32 93 kg/m²          Physical Exam  Vitals reviewed  Constitutional:       Appearance: Normal appearance  HENT:      Head: Normocephalic and atraumatic  Nose: Nose normal    Eyes:      Conjunctiva/sclera: Conjunctivae normal       Pupils: Pupils are equal, round, and reactive to light  Cardiovascular:      Pulses:           Dorsalis pedis pulses are 2+ on the left side  Posterior tibial pulses are 2+ on the left side  Pulmonary:      Effort: Pulmonary effort is normal    Musculoskeletal:        Feet:    Feet:      Left foot:      Skin integrity: Skin integrity normal       Comments: On clinical examination, there is mild edema to the patient's left first MTPJ with shortening of the first ray  There is tenderness palpation along the base of the left hallux  There is absence of motion of the first MTPJ secondary to this location  Neurovascular status is intact  Ecchymosis is noted to the plantar aspect of the first MTPJ where there is disruption of the sesamoid complex  Skin:     General: Skin is warm  Capillary Refill: Capillary refill takes less than 2 seconds  Neurological:      General: No focal deficit present  Mental Status: He is alert and oriented to person, place, and time     Psychiatric:    " "     Mood and Affect: Mood normal          Behavior: Behavior normal          Thought Content: Thought content normal            Orthopedic injury treatment    Date/Time: 5/31/2023 2:30 PM    Performed by: Maya Erickson DPM  Authorized by: Maya Erickson DPM    Patient Location:  [facility] Protocol:  Consent: Verbal consent obtained  Risks and benefits: risks, benefits and alternatives were discussed  Consent given by: patient  Time out: Immediately prior to procedure a \"time out\" was called to verify the correct patient, procedure, equipment, support staff and site/side marked as required  Timeout called at: 5/31/2023 2:30 PM   Patient understanding: patient states understanding of the procedure being performed  Patient consent: the patient's understanding of the procedure matches consent given  Patient identity confirmed: verbally with patient and provided demographic data      Injury location:  Toe  Location details:  Left great toe  Injury type:  Dislocation  Dislocation type: MTP    Neurovascular status: Neurovascularly intact    Distal perfusion: normal    Neurological function: normal    Range of motion: reduced    Local anesthesia used?: Yes    Anesthesia:  Nerve block  Local anesthetic:  Lidocaine 1% without epinephrine and lidocaine 2% without epinephrine  Anesthetic total (ml):  20  Manipulation performed?: Yes    Reduction successful?: Yes    Confirmation: Reduction confirmed by x-ray    Immobilization:  Crutches, other (comment) and tape (cam boot)  Neurovascular status: Neurovascularly intact    Distal perfusion: normal    Neurological function: normal    Range of motion: normal    Patient tolerance:  Patient tolerated the procedure well with no immediate complications   An attempt at closed reduction was made after a Dao block about the patient's left first ray  A total of 20 cc of a one-to-one mix of 1% Xylocaine plain and 0 25% bupivacaine plain was utilized    Once anesthesia was " achieved, the deformity was then distracted and the left hallux was successfully relocated back in its place  Repeat x-rays of the left foot showed good placement of the joint as well as good positioning of the sesamoid apparatus

## 2023-05-31 NOTE — PROGRESS NOTES
[address]        NAME: Zachary Castellano is a 61 y o  male  : 1965    MRN: [id number]  DATE: May 31, 2023  TIME: 11:58 AM    Assessment and Plan   Dislocation of metatarsal joint of left foot [S93 335A]  1  Dislocation of metatarsal joint of left foot  Ambulatory Referral to Podiatry      2  Left foot pain  XR foot 3+ vw left        Left foot x-ray preliminary read with  Dorsal first MTP dislocation  I called orthopedics to get a same-day appointment  They were able to get him into see Dr Arminda Meraz with podiatry at 130 today at the [facility]  Patient was provided with crutches for comfort  He took ibuprofen 4 hours prior  Patient Instructions       Follow up with PCP in 3-5 days  Proceed to  ER if symptoms worsen  Chief Complaint     Chief Complaint   Patient presents with   • Foot Pain     Pt states he tripped and fell this morning at 6:30 am while carrying a 50lb bucket landing on Lt foot  Swelling and mild discoloration on top of Lt foot  NO ice  History of Present Illness       Patient is a 50-year-old male presenting with injury of the left great toe  He states this morning he was carrying a 40 to 50 pound bucket of chemicals to dump into his pool  The bucket broke causing him to be off balance  His right foot went into the pool and he went down onto his left foot and knee  He was wearing sneakers at the time of injury  Since the fall he has been unable to bear weight onto his toe or move his big toe  He took 2 ibuprofen at 730 this morning and applied ice  Review of Systems   Review of Systems   Constitutional: Negative for activity change, chills and fever  Musculoskeletal: Positive for arthralgias, gait problem and joint swelling  Skin: Negative for color change and wound  Neurological: Negative for numbness           Current Medications       Current Outpatient Medications:   •  amLODIPine (NORVASC) 5 mg tablet, Take 1 tablet (5 mg total) by mouth daily, "Disp: 90 tablet, Rfl: 0  •  Multiple Vitamins-Minerals (MENS 50+ ADVANCED PO), Take by mouth daily after lunch, Disp: , Rfl:   •  pantoprazole (PROTONIX) 40 mg tablet, Take 1 tablet (40 mg total) by mouth daily, Disp: 90 tablet, Rfl: 2  •  tadalafil (CIALIS) 20 MG tablet, Take 1 tablet (20 mg total) by mouth daily as needed for erectile dysfunction, Disp: 12 tablet, Rfl: 0  •  zolpidem (AMBIEN) 5 mg tablet, Take 1 tablet (5 mg total) by mouth daily at bedtime as needed for sleep, Disp: 15 tablet, Rfl: 0    Current Allergies     Allergies as of 05/31/2023   • (No Known Allergies)            The following portions of the patient's history were reviewed and updated as appropriate: allergies, current medications, past family history, past medical history, past social history, past surgical history and problem list      Past Medical History:   Diagnosis Date   • Cellulitis     infection on abd x 2 mos   • GERD (gastroesophageal reflux disease)    • Hypertension    • Mass of abdomen     right   • Ventral incisional hernia 9/18/2018       Past Surgical History:   Procedure Laterality Date   • APPENDECTOMY  2008   • CYST REMOVAL      chest   • MO ESOPHAGOGASTRODUODENOSCOPY TRANSORAL DIAGNOSTIC N/A 8/28/2018    Procedure: ESOPHAGOGASTRODUODENOSCOPY (EGD); Surgeon: Marysol Munoz MD;  Location: [facility] GI LAB; Service: General   • MO EXC TUMOR SOFT TISSUE ABDL WALL SUBFASCIAL <5CM Right 9/18/2018    Procedure: EXPLORATION OF WOUND, EXCISION OF LIPOMA R   HERNIA REPAIR OF RIGHT GROIN WITH MESH;  Surgeon: Marysol Munoz MD;  Location: [facility];  Service: General       Family History   Problem Relation Age of Onset   • Stroke Mother          Medications have been verified  Objective   BP (!) 177/84   Pulse 68   Temp 97 5 °F (36 4 °C) (Temporal)   Resp 18   Ht 5' 6\" (1 676 m)   Wt 92 5 kg (204 lb)   SpO2 98%   BMI 32 93 kg/m²        Physical Exam     Physical Exam  Vitals reviewed     Constitutional:       " General: He is awake  He is not in acute distress  Appearance: Normal appearance  He is normal weight  Cardiovascular:      Rate and Rhythm: Normal rate  Pulses:           Dorsalis pedis pulses are 2+ on the left side  Posterior tibial pulses are 2+ on the left side  Pulmonary:      Effort: Pulmonary effort is normal    Musculoskeletal:      Left foot: Decreased range of motion  Deformity present  Feet:    Feet:      Left foot:      Skin integrity: Skin integrity normal  No skin breakdown or erythema  Comments: Distal sensation of the digits intact  <2 second cap refill  Unable to move great toe  Skin:     General: Skin is warm and moist    Neurological:      Mental Status: He is alert  Psychiatric:         Behavior: Behavior is cooperative

## 2023-05-31 NOTE — PATIENT INSTRUCTIONS
Follow up with podiatry today at 130 at the [facility] 126  The office is to the right of the main entrance of the hospital     Finger Dislocation   WHAT YOU NEED TO KNOW:   A finger dislocation happen when bones in your finger move out of their normal position  DISCHARGE INSTRUCTIONS:   Return to the emergency department if:   You have increased swelling under your splint or cast     You think your cast or splint is too tight  You cannot move your fingers  Call your doctor or hand specialist if:   You have numbness or tingling in your hand  The skin under your cast or splint burns or stings  The skin around your cast becomes red or raw  Your cast becomes cracked or damaged  You have questions or concerns about your condition or care  Medicines: You may need any of the following:  Prescription pain medicine  may be given  Ask your healthcare provider how to take this medicine safely  Some prescription pain medicines contain acetaminophen  Do not take other medicines that contain acetaminophen without talking to your healthcare provider  Too much acetaminophen may cause liver damage  Prescription pain medicine may cause constipation  Ask your healthcare provider how to prevent or treat constipation  Acetaminophen  decreases pain and fever  It is available without a doctor's order  Ask how much to take and how often to take it  Follow directions  Read the labels of all other medicines you are using to see if they also contain acetaminophen, or ask your doctor or pharmacist  Acetaminophen can cause liver damage if not taken correctly  NSAIDs , such as ibuprofen, help decrease swelling, pain, and fever  This medicine is available with or without a doctor's order  NSAIDs can cause stomach bleeding or kidney problems in certain people  If you take blood thinner medicine, always ask if NSAIDs are safe for you  Always read the medicine label and follow directions   Do not give these medicines to children younger than 6 months without direction from a healthcare provider  Take your medicine as directed  Contact your healthcare provider if you think your medicine is not helping or if you have side effects  Tell your provider if you are allergic to any medicine  Keep a list of the medicines, vitamins, and herbs you take  Include the amounts, and when and why you take them  Bring the list or the pill bottles to follow-up visits  Carry your medicine list with you in case of an emergency  Manage a finger dislocation:   Apply ice to your finger  Apply ice for 15 to 20 minutes every hour or as directed  Use an ice pack, or put crushed ice in a plastic bag  Cover it with a towel before you apply it to your finger  Ice helps prevent tissue damage and decreases swelling and pain  Elevate your finger above the level of your heart  This can help reduce swelling  Prop your arm or hand on a pillow  This should be done as often as you can for the first 1 to 3 days after your injury  Exercise your finger, as directed  Exercise can help reduce pain, swelling, and stiffness in your finger  It also can help increase strength and movement  You may need to exercise your finger as soon as you can  You also may be told not to move your finger for a few weeks  Be sure to follow your healthcare provider's instructions  Care for your splint or cast:   Do not get your splint or cast wet  Use a plastic bag to cover the splint or cast if you shower  Keep your splint or cast clean  Make sure no dirt gets under your splint or cast     Do not trim your cast without talking to your healthcare provider  Never remove your cast on your own  Follow up with your doctor or hand specialist as directed:  Write down your questions so you remember to ask them during your follow-up visits    © Copyright N[employer] Hind 2022 Information is for End User's use only and may not be sold, redistributed or otherwise used for commercial purposes  The above information is an  only  It is not intended as medical advice for individual conditions or treatments  Talk to your doctor, nurse or pharmacist before following any medical regimen to see if it is safe and effective for you

## 2023-05-31 NOTE — LETTER
May 31, 2023     Patient: Vianney Dennis  YOB: 1965  Date of Visit: 5/31/2023      To Whom it May Concern:    Bernard Campbell is under my professional care  Levi Alfaro was seen in my office on 5/31/2023  Levi Angelina may return to work on 6/12/23   If you have any questions or concerns, please don't hesitate to call           Sincerely,          Delia Favre, DPM        CC: No Recipients

## 2023-06-08 ENCOUNTER — OFFICE VISIT (OUTPATIENT)
Dept: PODIATRY | Facility: CLINIC | Age: 58
End: 2023-06-08

## 2023-06-08 ENCOUNTER — HOSPITAL ENCOUNTER (OUTPATIENT)
Dept: RADIOLOGY | Facility: HOSPITAL | Age: 58
End: 2023-06-08
Attending: PODIATRIST
Payer: COMMERCIAL

## 2023-06-08 VITALS
HEIGHT: 66 IN | DIASTOLIC BLOOD PRESSURE: 96 MMHG | OXYGEN SATURATION: 100 % | HEART RATE: 78 BPM | BODY MASS INDEX: 32.93 KG/M2 | SYSTOLIC BLOOD PRESSURE: 142 MMHG

## 2023-06-08 DIAGNOSIS — S93.335A: Primary | ICD-10-CM

## 2023-06-08 DIAGNOSIS — S93.335A: ICD-10-CM

## 2023-06-08 PROCEDURE — 99024 POSTOP FOLLOW-UP VISIT: CPT | Performed by: PODIATRIST

## 2023-06-08 PROCEDURE — 73630 X-RAY EXAM OF FOOT: CPT

## 2023-06-08 NOTE — LETTER
June 8, 2023     Patient: Stacy Persaud  YOB: 1965  Date of Visit: 6/8/2023      To Whom it May Concern:    Yuri Wilson is under my professional care  Heidi Oneil was seen in my office on 6/8/2023  Heidi Oneil may return to work on 6/12/23   He will need to wear a cam boot on his left foot for the next 2 weeks  If you have any questions or concerns, please don't hesitate to call           Sincerely,          Ailyn Collins DPM        CC: No Recipients

## 2023-06-08 NOTE — PROGRESS NOTES
Assessment/Plan:     Repeat x-rays of the patient's left foot taken today shows good alignment of the left great toe joint status post reduction of a first MTPJ dislocation  The patient's clinical examination is significant for for some very mild residual edema to the left great toe joint  There is no ecchymosis nor erythema nor calor  There is mild tenderness with palpation of the capsular structures of the first MTPJ  Passive range of motion yields some mild discomfort as well  Alignment of the left great toe is anatomic  The patient will continue guarded weightbearing in a cam boot for at least another 2 to 3 weeks  He is cleared to return to work effective Monday 6/12/23 with the cam boot on  Recommend follow-up in 4 weeks for recheck  Diagnoses and all orders for this visit:    Dislocation of metatarsal joint of left foot  -     X-ray foot left 3+ views; Future          Subjective:     Patient ID: Hua Curtis is a 61 y o  male  The patient presents today for follow-up status post reduction of a left first MTPJ dislocation  He is currently ambulating in cam boot and is doing well  With the cam boot on, he notes minimal discomfort in his left foot  He does admit to trying to walk around his house without the boot on and did note soreness and tenderness in his left great toe joint when trying to do so  PAST MEDICAL HISTORY:  Past Medical History:   Diagnosis Date   • Cellulitis     infection on abd x 2 mos   • GERD (gastroesophageal reflux disease)    • Hypertension    • Mass of abdomen     right   • Ventral incisional hernia 9/18/2018       PAST SURGICAL HISTORY:  Past Surgical History:   Procedure Laterality Date   • APPENDECTOMY  2008   • CYST REMOVAL      chest   • NV ESOPHAGOGASTRODUODENOSCOPY TRANSORAL DIAGNOSTIC N/A 8/28/2018    Procedure: ESOPHAGOGASTRODUODENOSCOPY (EGD); Surgeon: Jesi Smiley MD;  Location: [facility];   Service: General   • NV EXC TUMOR SOFT TISSUE ABDL WALL SUBFASCIAL <5CM Right 9/18/2018    Procedure: EXPLORATION OF WOUND, EXCISION OF LIPOMA R   HERNIA REPAIR OF RIGHT GROIN WITH MESH;  Surgeon: Sofia Hummel MD;  Location: [facility];  Service: General        ALLERGIES:  Patient has no known allergies  MEDICATIONS:  Current Outpatient Medications   Medication Sig Dispense Refill   • amLODIPine (NORVASC) 5 mg tablet Take 1 tablet (5 mg total) by mouth daily 90 tablet 0   • ibuprofen (MOTRIN) 600 mg tablet Take 1 tablet (600 mg total) by mouth every 8 (eight) hours as needed for mild pain 30 tablet 2   • Multiple Vitamins-Minerals (MENS 50+ ADVANCED PO) Take by mouth daily after lunch     • pantoprazole (PROTONIX) 40 mg tablet Take 1 tablet (40 mg total) by mouth daily 90 tablet 2   • tadalafil (CIALIS) 20 MG tablet Take 1 tablet (20 mg total) by mouth daily as needed for erectile dysfunction 12 tablet 0   • zolpidem (AMBIEN) 5 mg tablet Take 1 tablet (5 mg total) by mouth daily at bedtime as needed for sleep 15 tablet 0   • oxyCODONE-acetaminophen (Percocet) 5-325 mg per tablet Take 1 tablet by mouth every 8 (eight) hours as needed for severe pain Max Daily Amount: 3 tablets (Patient not taking: Reported on 6/8/2023) 6 tablet 0     No current facility-administered medications for this visit         SOCIAL HISTORY:  Social History     Socioeconomic History   • Marital status: Single     Spouse name: None   • Number of children: None   • Years of education: None   • Highest education level: None   Occupational History   • None   Tobacco Use   • Smoking status: Some Days     Packs/day: 0 50     Years: 25 00     Total pack years: 12 50     Types: Cigarettes   • Smokeless tobacco: Never   Substance and Sexual Activity   • Alcohol use: No   • Drug use: No   • Sexual activity: None   Other Topics Concern   • None   Social History Narrative   • None     Social Determinants of Health     Financial Resource Strain: Not on file   Food Insecurity: Not on file Transportation Needs: Not on file   Physical Activity: Not on file   Stress: Not on file   Social Connections: Not on file   Intimate Partner Violence: Not on file   Housing Stability: Not on file        Review of Systems   Constitutional: Negative  HENT: Negative  Eyes: Negative  Respiratory: Negative  Cardiovascular: Negative  Endocrine: Negative  Musculoskeletal: Negative  Neurological: Negative  Hematological: Negative  Psychiatric/Behavioral: Negative  Objective:     Physical Exam  Vitals reviewed  Constitutional:       Appearance: Normal appearance  HENT:      Head: Normocephalic and atraumatic  Nose: Nose normal    Eyes:      Conjunctiva/sclera: Conjunctivae normal       Pupils: Pupils are equal, round, and reactive to light  Cardiovascular:      Pulses:           Dorsalis pedis pulses are 2+ on the left side  Posterior tibial pulses are 2+ on the left side  Pulmonary:      Effort: Pulmonary effort is normal    Feet:      Left foot:      Skin integrity: Skin integrity normal       Comments: The patient's clinical examination is significant for for some very mild residual edema to the left great toe joint  There is no ecchymosis nor erythema nor calor  There is mild tenderness with palpation of the capsular structures of the first MTPJ  Passive range of motion yields some mild discomfort as well  Alignment of the left great toe is anatomic  Skin:     General: Skin is warm  Capillary Refill: Capillary refill takes less than 2 seconds  Neurological:      General: No focal deficit present  Mental Status: He is alert and oriented to person, place, and time  Psychiatric:         Mood and Affect: Mood normal          Behavior: Behavior normal          Thought Content:  Thought content normal

## 2023-07-06 ENCOUNTER — OFFICE VISIT (OUTPATIENT)
Dept: PODIATRY | Facility: CLINIC | Age: 58
End: 2023-07-06
Payer: COMMERCIAL

## 2023-07-06 VITALS
OXYGEN SATURATION: 97 % | HEIGHT: 66 IN | DIASTOLIC BLOOD PRESSURE: 92 MMHG | BODY MASS INDEX: 32.93 KG/M2 | HEART RATE: 82 BPM | SYSTOLIC BLOOD PRESSURE: 140 MMHG

## 2023-07-06 DIAGNOSIS — S93.335A: ICD-10-CM

## 2023-07-06 PROCEDURE — 99213 OFFICE O/P EST LOW 20 MIN: CPT | Performed by: PODIATRIST

## 2023-07-06 RX ORDER — IBUPROFEN 600 MG/1
600 TABLET ORAL EVERY 8 HOURS PRN
Qty: 30 TABLET | Refills: 2 | Status: SHIPPED | OUTPATIENT
Start: 2023-07-06

## 2023-07-06 NOTE — PROGRESS NOTES
Assessment/Plan: On clinical examination today, the patient is having minimal discomfort with palpation and passive range of motion of the left great toe joint. There is no civic and edema nor erythema nor ecchymosis. Neurovascular status is intact. The patient is doing well from a clinical standpoint. He is not having any significant pain or discomfort in his left foot. He has been working with his cam boot on and has been tolerating that well. He states that when he gets home at the end of the workday he does get out of it and wears a regular shoe and is able to do so without any significant pain or discomfort. At this point in time, I have recommended that the patient transition out of the cam boot and return to shoe even at work. He may gently return to activities as tolerated with a regular shoe on. I did refill his prescription for ibuprofen which she can take on as-needed basis for left foot pain. He will follow-up with me on as-needed basis. Diagnoses and all orders for this visit:    Dislocation of metatarsal joint of left foot  -     ibuprofen (MOTRIN) 600 mg tablet; Take 1 tablet (600 mg total) by mouth every 8 (eight) hours as needed for mild pain          Subjective:     Patient ID: Kyara Gar is a 61 y.o. male. The patient presents today for follow-up status post dislocation of the left great toe joint. He has been doing well and has been working with a cam boot on without any discomfort or pain in his left foot. He states that when he gets home at the end of the workday he transitions to regular shoe and is able to do so without any pain or discomfort. He is requesting a refill for ibuprofen 600 mg as he did tolerate that well and felt that it did help with his left foot pain when he had it. As he is transitioning back to regular shoe he would like to have some in the event that he has any flareups in his left foot.   Otherwise, he is doing well and has no new complaints today. PAST MEDICAL HISTORY:  Past Medical History:   Diagnosis Date   • Cellulitis     infection on abd x 2 mos   • GERD (gastroesophageal reflux disease)    • Hypertension    • Mass of abdomen     right   • Ventral incisional hernia 9/18/2018       PAST SURGICAL HISTORY:  Past Surgical History:   Procedure Laterality Date   • APPENDECTOMY  2008   • CYST REMOVAL      chest   • MO ESOPHAGOGASTRODUODENOSCOPY TRANSORAL DIAGNOSTIC N/A 8/28/2018    Procedure: ESOPHAGOGASTRODUODENOSCOPY (EGD); Surgeon: Skip Mcnally MD;  Location: [facility]; Service: General   • MO EXC TUMOR SOFT TISSUE ABDL WALL SUBFASCIAL <5CM Right 9/18/2018    Procedure: EXPLORATION OF WOUND, EXCISION OF LIPOMA R . HERNIA REPAIR OF RIGHT GROIN WITH MESH;  Surgeon: Skip Mcnally MD;  Location: [facility];  Service: General        ALLERGIES:  Patient has no known allergies. MEDICATIONS:  Current Outpatient Medications   Medication Sig Dispense Refill   • amLODIPine (NORVASC) 5 mg tablet Take 1 tablet (5 mg total) by mouth daily 90 tablet 0   • ibuprofen (MOTRIN) 600 mg tablet Take 1 tablet (600 mg total) by mouth every 8 (eight) hours as needed for mild pain 30 tablet 2   • Multiple Vitamins-Minerals (MENS 50+ ADVANCED PO) Take by mouth daily after lunch     • pantoprazole (PROTONIX) 40 mg tablet Take 1 tablet (40 mg total) by mouth daily 90 tablet 2   • tadalafil (CIALIS) 20 MG tablet Take 1 tablet (20 mg total) by mouth daily as needed for erectile dysfunction 12 tablet 0   • zolpidem (AMBIEN) 5 mg tablet Take 1 tablet (5 mg total) by mouth daily at bedtime as needed for sleep 15 tablet 0     No current facility-administered medications for this visit.        SOCIAL HISTORY:  Social History     Socioeconomic History   • Marital status: Single     Spouse name: None   • Number of children: None   • Years of education: None   • Highest education level: None   Occupational History   • None   Tobacco Use   • Smoking status: Some Days     Packs/day: 0.50     Years: 25.00     Total pack years: 12.50     Types: Cigarettes   • Smokeless tobacco: Never   Substance and Sexual Activity   • Alcohol use: No   • Drug use: No   • Sexual activity: None   Other Topics Concern   • None   Social History Narrative   • None     Social Determinants of Health     Financial Resource Strain: Not on file   Food Insecurity: Not on file   Transportation Needs: Not on file   Physical Activity: Not on file   Stress: Not on file   Social Connections: Not on file   Intimate Partner Violence: Not on file   Housing Stability: Not on file        Review of Systems   Constitutional: Negative. HENT: Negative. Eyes: Negative. Respiratory: Negative. Cardiovascular: Negative. Endocrine: Negative. Musculoskeletal: Negative. Skin: Negative. Neurological: Negative. Hematological: Negative. Psychiatric/Behavioral: Negative. Objective:     Physical Exam  Vitals reviewed. Constitutional:       Appearance: Normal appearance. HENT:      Head: Normocephalic and atraumatic. Nose: Nose normal.   Eyes:      Conjunctiva/sclera: Conjunctivae normal.      Pupils: Pupils are equal, round, and reactive to light. Cardiovascular:      Pulses:           Dorsalis pedis pulses are 2+ on the left side. Posterior tibial pulses are 2+ on the left side. Pulmonary:      Effort: Pulmonary effort is normal.   Feet:      Left foot:      Skin integrity: Skin integrity normal.      Comments: On clinical examination today, the patient is having minimal discomfort with palpation and passive range of motion of the left great toe joint. There is no civic and edema nor erythema nor ecchymosis. Neurovascular status is intact. Skin:     General: Skin is warm. Capillary Refill: Capillary refill takes less than 2 seconds. Neurological:      General: No focal deficit present. Mental Status: He is alert and oriented to person, place, and time. Psychiatric:         Mood and Affect: Mood normal.         Behavior: Behavior normal.         Thought Content:  Thought content normal.

## 2023-07-12 ENCOUNTER — OFFICE VISIT (OUTPATIENT)
Dept: FAMILY MEDICINE CLINIC | Facility: CLINIC | Age: 58
End: 2023-07-12
Payer: COMMERCIAL

## 2023-07-12 VITALS
OXYGEN SATURATION: 97 % | HEIGHT: 66 IN | HEART RATE: 80 BPM | WEIGHT: 202 LBS | SYSTOLIC BLOOD PRESSURE: 126 MMHG | DIASTOLIC BLOOD PRESSURE: 78 MMHG | BODY MASS INDEX: 32.47 KG/M2

## 2023-07-12 DIAGNOSIS — K21.9 GASTROESOPHAGEAL REFLUX DISEASE WITHOUT ESOPHAGITIS: ICD-10-CM

## 2023-07-12 DIAGNOSIS — E66.09 CLASS 1 OBESITY DUE TO EXCESS CALORIES WITHOUT SERIOUS COMORBIDITY WITH BODY MASS INDEX (BMI) OF 32.0 TO 32.9 IN ADULT: ICD-10-CM

## 2023-07-12 DIAGNOSIS — M54.50 CHRONIC MIDLINE LOW BACK PAIN WITHOUT SCIATICA: ICD-10-CM

## 2023-07-12 DIAGNOSIS — G89.29 CHRONIC MIDLINE LOW BACK PAIN WITHOUT SCIATICA: ICD-10-CM

## 2023-07-12 DIAGNOSIS — S93.335A: ICD-10-CM

## 2023-07-12 DIAGNOSIS — F51.01 PRIMARY INSOMNIA: Primary | ICD-10-CM

## 2023-07-12 DIAGNOSIS — I10 PRIMARY HYPERTENSION: ICD-10-CM

## 2023-07-12 DIAGNOSIS — N52.9 ERECTILE DYSFUNCTION, UNSPECIFIED ERECTILE DYSFUNCTION TYPE: ICD-10-CM

## 2023-07-12 PROBLEM — K31.9 LESION OF STOMACH: Status: RESOLVED | Noted: 2018-08-28 | Resolved: 2023-07-12

## 2023-07-12 PROBLEM — D17.1 LIPOMA OF ABDOMINAL WALL: Status: RESOLVED | Noted: 2018-09-18 | Resolved: 2023-07-12

## 2023-07-12 PROCEDURE — 99214 OFFICE O/P EST MOD 30 MIN: CPT | Performed by: INTERNAL MEDICINE

## 2023-07-12 RX ORDER — SILDENAFIL 100 MG/1
100 TABLET, FILM COATED ORAL DAILY PRN
Qty: 12 TABLET | Refills: 0 | Status: SHIPPED | OUTPATIENT
Start: 2023-07-12

## 2023-07-12 RX ORDER — BACLOFEN 10 MG/1
10 TABLET ORAL DAILY PRN
Qty: 30 TABLET | Refills: 0 | Status: SHIPPED | OUTPATIENT
Start: 2023-07-12

## 2023-07-12 RX ORDER — ZOLPIDEM TARTRATE 5 MG/1
5 TABLET ORAL
Qty: 30 TABLET | Refills: 0 | Status: SHIPPED | OUTPATIENT
Start: 2023-07-12

## 2023-07-12 NOTE — ASSESSMENT & PLAN NOTE
Patient BMI is 32.60 again emphasized regarding diet exercise lose weight lifestyle modification cutting back calorie intake and carbohydrate intake

## 2023-07-12 NOTE — ASSESSMENT & PLAN NOTE
Patient had a history of dislocation of metatarsal joint of the left foot for which she was followed by the podiatrist has fixed it currently patient is not on any boot ambulatory without any problem.

## 2023-07-12 NOTE — ASSESSMENT & PLAN NOTE
Patient with GERD symptoms on pantoprazole being followed by the gastroenterologist.  GI note appreciated

## 2023-07-12 NOTE — ASSESSMENT & PLAN NOTE
Patient sometimes gets pain in the low back area muscle pain when he does have access to work we will prescribe baclofen to be taken only on as needed basis to which she has responded in the past

## 2023-07-12 NOTE — PROGRESS NOTES
Name: Jesi Rudolph      : 1965      MRN: [id number]  Encounter Provider: Jon Doran MD  Encounter Date: 2023   Encounter department: [address] [place] [address] Road     1. Primary insomnia  Assessment & Plan:  Patient still has difficulty with sleeping now and then I prescribed in the past Ambien to be taken only on as needed basis we will continue with the same. Orders:  -     zolpidem (AMBIEN) 5 mg tablet; Take 1 tablet (5 mg total) by mouth daily at bedtime as needed for sleep    2. Primary hypertension  Assessment & Plan:  Patient's blood pressure initially was 140/90 when he came in subsequent reading was 126/78 currently he is taking amlodipine 5 mg daily we will continue with the same. Recommended strongly to cut back on salt intake. 3. Erectile dysfunction, unspecified erectile dysfunction type  Assessment & Plan:  Patient prefers sildenafil so we will discontinue the Cialis and write for the Viagra 100 mg    Orders:  -     sildenafil (VIAGRA) 100 mg tablet; Take 1 tablet (100 mg total) by mouth daily as needed for erectile dysfunction    4. Gastroesophageal reflux disease without esophagitis  Assessment & Plan:  Patient with GERD symptoms on pantoprazole being followed by the gastroenterologist.  GI note appreciated      5. Dislocation of metatarsal joint of left foot  Assessment & Plan:  Patient had a history of dislocation of metatarsal joint of the left foot for which she was followed by the podiatrist has fixed it currently patient is not on any boot ambulatory without any problem. 6. Class 1 obesity due to excess calories without serious comorbidity with body mass index (BMI) of 32.0 to 32.9 in adult  Assessment & Plan:  Patient BMI is 32.60 again emphasized regarding diet exercise lose weight lifestyle modification cutting back calorie intake and carbohydrate intake      7.  Chronic midline low back pain without sciatica  Assessment & Plan:  Patient sometimes gets pain in the low back area muscle pain when he does have access to work we will prescribe baclofen to be taken only on as needed basis to which she has responded in the past    Orders:  -     baclofen 10 mg tablet; Take 1 tablet (10 mg total) by mouth daily as needed for muscle spasms         Subjective     Patient is coming here for evaluation regarding symptoms of his hypertension also patient has sometimes difficulty sleeping in the night. On and off he has developed back pain many times some excessive work difficulty with sleeping with the back pain muscle pains. He has also got history of erectile dysfunction. Medications reviewed labs reviewed. Review of Systems   Constitutional: Negative for chills, fatigue and fever. HENT: Negative for ear pain, sore throat and trouble swallowing. Eyes: Negative for pain and visual disturbance. Respiratory: Negative for cough and shortness of breath. Cardiovascular: Negative for chest pain and palpitations. Gastrointestinal: Negative for abdominal pain, constipation, diarrhea and vomiting. Endocrine: Negative for polydipsia. Genitourinary: Negative for difficulty urinating, dysuria, hematuria and testicular pain. Musculoskeletal: Negative for arthralgias, back pain and myalgias. Skin: Negative for color change and rash. Neurological: Negative for dizziness, tremors, seizures, syncope and headaches. Psychiatric/Behavioral: Negative for confusion and sleep disturbance. The patient is not nervous/anxious. All other systems reviewed and are negative.       Past Medical History:   Diagnosis Date   • Cellulitis     infection on abd x 2 mos   • GERD (gastroesophageal reflux disease)    • Hypertension    • Mass of abdomen     right   • Ventral incisional hernia 9/18/2018     Past Surgical History:   Procedure Laterality Date   • APPENDECTOMY  2008   • CYST REMOVAL      chest   • MI ESOPHAGOGASTRODUODENOSCOPY TRANSORAL DIAGNOSTIC N/A 8/28/2018    Procedure: ESOPHAGOGASTRODUODENOSCOPY (EGD); Surgeon: Hardik Durant MD;  Location: [facility] GI [facility];   Service: General   • AR EXC TUMOR SOFT TISSUE ABDL WALL SUBFASCIAL <5CM Right 9/18/2018    Procedure: EXPLORATION OF WOUND, EXCISION OF LIPOMA R . HERNIA REPAIR OF RIGHT GROIN WITH MESH;  Surgeon: Hardik Durant MD;  Location: [facility];  Service: General     Family History   Problem Relation Age of Onset   • Stroke Mother      Social History     Socioeconomic History   • Marital status: Single     Spouse name: None   • Number of children: None   • Years of education: None   • Highest education level: None   Occupational History   • None   Tobacco Use   • Smoking status: Some Days     Packs/day: 0.50     Years: 25.00     Total pack years: 12.50     Types: Cigarettes   • Smokeless tobacco: Never   Substance and Sexual Activity   • Alcohol use: No   • Drug use: No   • Sexual activity: None   Other Topics Concern   • None   Social History Narrative   • None     Social Determinants of Health     Financial Resource Strain: Not on file   Food Insecurity: Not on file   Transportation Needs: Not on file   Physical Activity: Not on file   Stress: Not on file   Social Connections: Not on file   Intimate Partner Violence: Not on file   Housing Stability: Not on file     Current Outpatient Medications on File Prior to Visit   Medication Sig   • amLODIPine (NORVASC) 5 mg tablet Take 1 tablet (5 mg total) by mouth daily   • ibuprofen (MOTRIN) 600 mg tablet Take 1 tablet (600 mg total) by mouth every 8 (eight) hours as needed for mild pain   • Multiple Vitamins-Minerals (MENS 50+ ADVANCED PO) Take by mouth daily after lunch   • pantoprazole (PROTONIX) 40 mg tablet Take 1 tablet (40 mg total) by mouth daily   • [DISCONTINUED] tadalafil (CIALIS) 20 MG tablet Take 1 tablet (20 mg total) by mouth daily as needed for erectile dysfunction   • [DISCONTINUED] zolpidem (AMBIEN) 5 mg tablet Take 1 tablet (5 mg total) by mouth daily at bedtime as needed for sleep     No Known Allergies  Immunization History   Administered Date(s) Administered   • COVID-19 PFIZER VACCINE 0.3 ML IM 12/17/2021   • INFLUENZA 10/21/2022       Objective     /78 (BP Location: Right arm, Patient Position: Sitting, Cuff Size: Standard)   Pulse 80   Ht 5' 6" (1.676 m)   Wt 91.6 kg (202 lb)   SpO2 97%   BMI 32.60 kg/m²     Physical Exam  Vitals and nursing note reviewed. Constitutional:       Appearance: Normal appearance. He is obese. He is not ill-appearing. HENT:      Right Ear: External ear normal.      Left Ear: External ear normal.      Nose: Nose normal. No rhinorrhea. Mouth/Throat:      Pharynx: Oropharynx is clear. No posterior oropharyngeal erythema. Eyes:      Conjunctiva/sclera: Conjunctivae normal.   Cardiovascular:      Rate and Rhythm: Normal rate and regular rhythm. Pulses: Normal pulses. Heart sounds: Normal heart sounds. No murmur heard. Pulmonary:      Effort: Pulmonary effort is normal. No respiratory distress. Breath sounds: Normal breath sounds. No wheezing. Abdominal:      General: Bowel sounds are normal. There is no distension. Palpations: Abdomen is soft. Tenderness: There is no abdominal tenderness. Musculoskeletal:         General: No deformity. Cervical back: Normal range of motion. Right lower leg: No edema. Left lower leg: No edema. Lymphadenopathy:      Cervical: No cervical adenopathy. Skin:     Coloration: Skin is not jaundiced or pale. Findings: No rash. Neurological:      General: No focal deficit present. Mental Status: He is alert and oriented to person, place, and time. Sensory: No sensory deficit. Motor: No weakness.       Gait: Gait normal.   Psychiatric:         Mood and Affect: Mood normal.         Behavior: Behavior normal.         Judgment: Judgment normal.       Mike Florez MD

## 2023-07-12 NOTE — ASSESSMENT & PLAN NOTE
Patient's blood pressure initially was 140/90 when he came in subsequent reading was 126/78 currently he is taking amlodipine 5 mg daily we will continue with the same. Recommended strongly to cut back on salt intake.

## 2023-07-12 NOTE — ASSESSMENT & PLAN NOTE
Patient still has difficulty with sleeping now and then I prescribed in the past Ambien to be taken only on as needed basis we will continue with the same.

## 2023-08-09 DIAGNOSIS — M54.50 CHRONIC MIDLINE LOW BACK PAIN WITHOUT SCIATICA: ICD-10-CM

## 2023-08-09 DIAGNOSIS — G89.29 CHRONIC MIDLINE LOW BACK PAIN WITHOUT SCIATICA: ICD-10-CM

## 2023-08-09 RX ORDER — BACLOFEN 10 MG/1
TABLET ORAL
Qty: 30 TABLET | Refills: 0 | Status: SHIPPED | OUTPATIENT
Start: 2023-08-09

## 2023-08-26 DIAGNOSIS — N52.9 ERECTILE DYSFUNCTION, UNSPECIFIED ERECTILE DYSFUNCTION TYPE: ICD-10-CM

## 2023-08-26 RX ORDER — SILDENAFIL 100 MG/1
100 TABLET, FILM COATED ORAL
Qty: 12 TABLET | Refills: 0 | Status: SHIPPED | OUTPATIENT
Start: 2023-08-26

## 2023-09-08 DIAGNOSIS — M54.50 CHRONIC MIDLINE LOW BACK PAIN WITHOUT SCIATICA: ICD-10-CM

## 2023-09-08 DIAGNOSIS — G89.29 CHRONIC MIDLINE LOW BACK PAIN WITHOUT SCIATICA: ICD-10-CM

## 2023-09-08 RX ORDER — BACLOFEN 10 MG/1
TABLET ORAL
Qty: 30 TABLET | Refills: 0 | Status: SHIPPED | OUTPATIENT
Start: 2023-09-08

## 2023-09-09 DIAGNOSIS — S93.335A: ICD-10-CM

## 2023-09-11 RX ORDER — IBUPROFEN 600 MG/1
TABLET ORAL
Qty: 30 TABLET | Refills: 2 | Status: SHIPPED | OUTPATIENT
Start: 2023-09-11

## 2023-09-14 DIAGNOSIS — I10 PRIMARY HYPERTENSION: ICD-10-CM

## 2023-09-14 RX ORDER — AMLODIPINE BESYLATE 5 MG/1
5 TABLET ORAL DAILY
Qty: 90 TABLET | Refills: 0 | Status: SHIPPED | OUTPATIENT
Start: 2023-09-14

## 2023-10-09 ENCOUNTER — RA CDI HCC (OUTPATIENT)
Dept: OTHER | Facility: HOSPITAL | Age: 58
End: 2023-10-09

## 2023-10-09 NOTE — PROGRESS NOTES
[address] [facility] coding opportunities       Chart reviewed, no opportunity found: CHART REVIEWED, NO OPPORTUNITY FOUND        Patients Insurance        Commercial Insurance: [address]

## 2023-10-14 DIAGNOSIS — N52.9 ERECTILE DYSFUNCTION, UNSPECIFIED ERECTILE DYSFUNCTION TYPE: ICD-10-CM

## 2023-10-16 RX ORDER — SILDENAFIL 100 MG/1
TABLET, FILM COATED ORAL
Qty: 12 TABLET | Refills: 2 | Status: SHIPPED | OUTPATIENT
Start: 2023-10-16 | End: 2023-10-18 | Stop reason: SDUPTHER

## 2023-10-18 ENCOUNTER — OFFICE VISIT (OUTPATIENT)
Dept: FAMILY MEDICINE CLINIC | Facility: CLINIC | Age: 58
End: 2023-10-18
Payer: COMMERCIAL

## 2023-10-18 VITALS
DIASTOLIC BLOOD PRESSURE: 98 MMHG | SYSTOLIC BLOOD PRESSURE: 142 MMHG | HEART RATE: 98 BPM | HEIGHT: 66 IN | BODY MASS INDEX: 32.78 KG/M2 | WEIGHT: 204 LBS | OXYGEN SATURATION: 98 %

## 2023-10-18 DIAGNOSIS — K21.9 GASTROESOPHAGEAL REFLUX DISEASE WITHOUT ESOPHAGITIS: ICD-10-CM

## 2023-10-18 DIAGNOSIS — Z12.5 SCREENING FOR PROSTATE CANCER: ICD-10-CM

## 2023-10-18 DIAGNOSIS — E78.5 DYSLIPIDEMIA: ICD-10-CM

## 2023-10-18 DIAGNOSIS — Z13.29 SCREENING FOR THYROID DISORDER: ICD-10-CM

## 2023-10-18 DIAGNOSIS — G89.29 CHRONIC MIDLINE LOW BACK PAIN WITHOUT SCIATICA: ICD-10-CM

## 2023-10-18 DIAGNOSIS — F51.01 PRIMARY INSOMNIA: ICD-10-CM

## 2023-10-18 DIAGNOSIS — N52.9 ERECTILE DYSFUNCTION, UNSPECIFIED ERECTILE DYSFUNCTION TYPE: ICD-10-CM

## 2023-10-18 DIAGNOSIS — R73.03 PRE-DIABETES: ICD-10-CM

## 2023-10-18 DIAGNOSIS — I10 PRIMARY HYPERTENSION: Primary | ICD-10-CM

## 2023-10-18 DIAGNOSIS — M54.50 CHRONIC MIDLINE LOW BACK PAIN WITHOUT SCIATICA: ICD-10-CM

## 2023-10-18 DIAGNOSIS — E66.09 CLASS 1 OBESITY DUE TO EXCESS CALORIES WITHOUT SERIOUS COMORBIDITY WITH BODY MASS INDEX (BMI) OF 32.0 TO 32.9 IN ADULT: ICD-10-CM

## 2023-10-18 DIAGNOSIS — Z12.5 SCREENING PSA (PROSTATE SPECIFIC ANTIGEN): ICD-10-CM

## 2023-10-18 DIAGNOSIS — Z13.0 SCREENING FOR DEFICIENCY ANEMIA: ICD-10-CM

## 2023-10-18 DIAGNOSIS — K31.9 LESION OF STOMACH: ICD-10-CM

## 2023-10-18 PROBLEM — S93.335A: Status: RESOLVED | Noted: 2023-05-31 | Resolved: 2023-10-18

## 2023-10-18 PROCEDURE — 99214 OFFICE O/P EST MOD 30 MIN: CPT | Performed by: INTERNAL MEDICINE

## 2023-10-18 RX ORDER — BACLOFEN 10 MG/1
10 TABLET ORAL 2 TIMES DAILY
Qty: 30 TABLET | Refills: 0 | Status: SHIPPED | OUTPATIENT
Start: 2023-10-18

## 2023-10-18 RX ORDER — AMLODIPINE BESYLATE 5 MG/1
5 TABLET ORAL DAILY
Qty: 90 TABLET | Refills: 0 | Status: SHIPPED | OUTPATIENT
Start: 2023-10-18

## 2023-10-18 RX ORDER — SILDENAFIL 100 MG/1
100 TABLET, FILM COATED ORAL AS NEEDED
Qty: 12 TABLET | Refills: 2 | Status: SHIPPED | OUTPATIENT
Start: 2023-10-18

## 2023-10-18 RX ORDER — ZOLPIDEM TARTRATE 5 MG/1
5 TABLET ORAL
Qty: 30 TABLET | Refills: 0 | Status: SHIPPED | OUTPATIENT
Start: 2023-10-18

## 2023-10-18 RX ORDER — PANTOPRAZOLE SODIUM 40 MG/1
40 TABLET, DELAYED RELEASE ORAL DAILY PRN
Qty: 90 TABLET | Refills: 2 | Status: SHIPPED | OUTPATIENT
Start: 2023-10-18

## 2023-10-18 NOTE — ASSESSMENT & PLAN NOTE
Patient with difficulty with sleeping on and off especially when he is tired he takes the zolpidem as needed we will continue with the same.

## 2023-10-18 NOTE — ASSESSMENT & PLAN NOTE
Patient BMI is at 22.93 again emphasized regarding diet exercise cutting back calorie intake lifestyle modification lose weight.

## 2023-10-18 NOTE — PROGRESS NOTES
Name: Brionna Duran      : 1965      MRN: [id number]  Encounter Provider: Janessa Thrasher MD  Encounter Date: 10/18/2023   Encounter department: [address] [place] [address]     1. Primary hypertension  Assessment & Plan:  Blood pressure today in the office is 142/98 however he has not taken his blood pressure medication this morning and also he had very heavy work at the workplace recommend patient to monitor blood pressures if possible at home and bring the readings. Cut back on the salt intake    Orders:  -     amLODIPine (NORVASC) 5 mg tablet; Take 1 tablet (5 mg total) by mouth daily    2. Erectile dysfunction, unspecified erectile dysfunction type  Assessment & Plan:  Continue sildenafil as needed    Orders:  -     sildenafil (VIAGRA) 100 mg tablet; Take 1 tablet (100 mg total) by mouth as needed for erectile dysfunction    3. Primary insomnia  Assessment & Plan:  Patient with difficulty with sleeping on and off especially when he is tired he takes the zolpidem as needed we will continue with the same. Orders:  -     zolpidem (AMBIEN) 5 mg tablet; Take 1 tablet (5 mg total) by mouth daily at bedtime as needed for sleep    4. Chronic midline low back pain without sciatica  Assessment & Plan:  Patient is still getting low back pain on and off and he takes a muscle relaxer which appears to be helping a lot baclofen 10 mg. At the workplace he ends up lifting heavy objects also which can trigger this back pain. Orders:  -     baclofen 10 mg tablet; Take 1 tablet (10 mg total) by mouth 2 (two) times a day    5. Class 1 obesity due to excess calories without serious comorbidity with body mass index (BMI) of 32.0 to 32.9 in adult  Assessment & Plan:  Patient BMI is at 22.93 again emphasized regarding diet exercise cutting back calorie intake lifestyle modification lose weight.       6. Gastroesophageal reflux disease without esophagitis  Assessment & Plan:  Patient is taking pantoprazole only as needed when he has symptoms of GERD      7. Lesion of stomach  -     pantoprazole (PROTONIX) 40 mg tablet; Take 1 tablet (40 mg total) by mouth daily as needed (Heartburn)      BMI Counseling: Body mass index is 32.93 kg/m². The BMI is above normal. Nutrition recommendations include decreasing portion sizes, encouraging healthy choices of fruits and vegetables, consuming healthier snacks, limiting drinks that contain sugar, moderation in carbohydrate intake and reducing intake of cholesterol. Exercise recommendations include strength training exercises. No pharmacotherapy was ordered. Rationale for BMI follow-up plan is due to patient being overweight or obese. Subjective     Patient is coming here for evaluation regarding his hypertension, history of insomnia on and off, GERD. Patient denies symptoms of chest pain palpitation shortness of breath however he has been having symptoms of insomnia on and off especially when he is very tired at the workplace. Medications reviewed labs reviewed. Review of Systems   Constitutional:  Negative for chills, fatigue and fever. HENT:  Negative for ear pain, sore throat and trouble swallowing. Eyes:  Negative for pain and visual disturbance. Respiratory:  Negative for cough and shortness of breath. Cardiovascular:  Negative for chest pain and palpitations. Gastrointestinal:  Negative for abdominal pain, constipation, diarrhea and vomiting. Genitourinary:  Negative for difficulty urinating, dysuria, frequency, hematuria and testicular pain. Musculoskeletal:  Positive for arthralgias and back pain. Skin:  Negative for color change and rash. Neurological:  Negative for dizziness, seizures, syncope, light-headedness and headaches. Psychiatric/Behavioral:  Positive for sleep disturbance. Negative for agitation, confusion, hallucinations and suicidal ideas. All other systems reviewed and are negative.       Past Medical History:   Diagnosis Date    Cellulitis     infection on abd x 2 mos    Dislocation of metatarsal joint of left foot 05/31/2023    GERD (gastroesophageal reflux disease)     Hypertension     Mass of abdomen     right    Ventral incisional hernia 09/18/2018     Past Surgical History:   Procedure Laterality Date    APPENDECTOMY  2008    CYST REMOVAL      chest    IA ESOPHAGOGASTRODUODENOSCOPY TRANSORAL DIAGNOSTIC N/A 8/28/2018    Procedure: ESOPHAGOGASTRODUODENOSCOPY (EGD); Surgeon: Ivy Suarez MD;  Location: [facility] GI LAB;   Service: General    IA EXC TUMOR SOFT TISSUE ABDL WALL SUBFASCIAL <5CM Right 9/18/2018    Procedure: EXPLORATION OF WOUND, EXCISION OF LIPOMA R . HERNIA REPAIR OF RIGHT GROIN WITH MESH;  Surgeon: Ivy Suarez MD;  Location: [facility];  Service: General     Family History   Problem Relation Age of Onset    Stroke Mother      Social History     Socioeconomic History    Marital status: Single     Spouse name: None    Number of children: None    Years of education: None    Highest education level: None   Occupational History    None   Tobacco Use    Smoking status: Some Days     Packs/day: 0.50     Years: 25.00     Total pack years: 12.50     Types: Cigarettes    Smokeless tobacco: Never   Substance and Sexual Activity    Alcohol use: No    Drug use: No    Sexual activity: None   Other Topics Concern    None   Social History Narrative    None     Social Determinants of Health     Financial Resource Strain: Not on file   Food Insecurity: Not on file   Transportation Needs: Not on file   Physical Activity: Not on file   Stress: Not on file   Social Connections: Not on file   Intimate Partner Violence: Not on file   Housing Stability: Not on file     Current Outpatient Medications on File Prior to Visit   Medication Sig    ibuprofen (MOTRIN) 600 mg tablet TAKE 1 TABLET(600 MG) BY MOUTH EVERY 8 HOURS AS NEEDED FOR MILD PAIN    Multiple Vitamins-Minerals (MENS 50+ ADVANCED PO) Take by mouth daily after lunch    [DISCONTINUED] amLODIPine (NORVASC) 5 mg tablet TAKE 1 TABLET(5 MG) BY MOUTH DAILY    [DISCONTINUED] baclofen 10 mg tablet TAKE 1 TABLET(10 MG) BY MOUTH DAILY AS NEEDED FOR MUSCLE SPASMS    [DISCONTINUED] pantoprazole (PROTONIX) 40 mg tablet Take 1 tablet (40 mg total) by mouth daily    [DISCONTINUED] sildenafil (VIAGRA) 100 mg tablet TAKE 1 TABLET(100 MG) BY MOUTH DAILY AS NEEDED FOR ERECTILE DYSFUNCTION    [DISCONTINUED] zolpidem (AMBIEN) 5 mg tablet Take 1 tablet (5 mg total) by mouth daily at bedtime as needed for sleep     No Known Allergies  Immunization History   Administered Date(s) Administered    COVID-19 PFIZER VACCINE 0.3 ML IM 12/17/2021    INFLUENZA 10/21/2022       Objective     /98   Pulse 98   Ht 5' 6" (1.676 m)   Wt 92.5 kg (204 lb)   SpO2 98%   BMI 32.93 kg/m²     Physical Exam  Vitals and nursing note reviewed. Constitutional:       Appearance: Normal appearance. He is obese. He is not ill-appearing. HENT:      Right Ear: External ear normal.      Left Ear: External ear normal.   Eyes:      General:         Right eye: No discharge. Left eye: No discharge. Conjunctiva/sclera: Conjunctivae normal.   Cardiovascular:      Rate and Rhythm: Normal rate and regular rhythm. Pulses: Normal pulses. Heart sounds: Normal heart sounds. No murmur heard. Pulmonary:      Effort: Pulmonary effort is normal. No respiratory distress. Breath sounds: Normal breath sounds. No wheezing. Abdominal:      Palpations: Abdomen is soft. Musculoskeletal:      Right lower leg: No edema. Left lower leg: No edema. Skin:     General: Skin is warm. Coloration: Skin is not jaundiced or pale. Neurological:      General: No focal deficit present. Mental Status: He is alert and oriented to person, place, and time. Motor: No weakness.       Gait: Gait normal.   Psychiatric:         Mood and Affect: Mood normal.         Behavior: Behavior normal. Judgment: Judgment normal.       Carolynn Lobo MD

## 2023-10-18 NOTE — ASSESSMENT & PLAN NOTE
Blood pressure today in the office is 142/98 however he has not taken his blood pressure medication this morning and also he had very heavy work at the workplace recommend patient to monitor blood pressures if possible at home and bring the readings.   Cut back on the salt intake

## 2024-03-04 ENCOUNTER — RA CDI HCC (OUTPATIENT)
Dept: OTHER | Facility: HOSPITAL | Age: 59
End: 2024-03-04

## 2024-03-04 NOTE — PROGRESS NOTES
HCC coding opportunities       Chart reviewed, no opportunity found: CHART REVIEWED, NO OPPORTUNITY FOUND        Patients Insurance        Commercial Insurance: [employer]

## 2024-03-06 LAB
ALBUMIN SERPL-MCNC: 4.5 G/DL (ref 3.8–4.9)
ALBUMIN/GLOB SERPL: 1.7 {RATIO} (ref 1.2–2.2)
ALP SERPL-CCNC: 87 IU/L (ref 44–121)
ALT SERPL-CCNC: 38 IU/L (ref 0–44)
APPEARANCE UR: CLEAR
AST SERPL-CCNC: 23 IU/L (ref 0–40)
BACTERIA URNS QL MICRO: NORMAL
BASOPHILS # BLD AUTO: 0.1 X10E3/UL (ref 0–0.2)
BASOPHILS NFR BLD AUTO: 1 %
BILIRUB SERPL-MCNC: 0.6 MG/DL (ref 0–1.2)
BILIRUB UR QL STRIP: NEGATIVE
BUN SERPL-MCNC: 12 MG/DL (ref 6–24)
BUN/CREAT SERPL: 12 (ref 9–20)
CALCIUM SERPL-MCNC: 8.9 MG/DL (ref 8.7–10.2)
CASTS URNS QL MICRO: NORMAL /LPF
CHLORIDE SERPL-SCNC: 104 MMOL/L (ref 96–106)
CHOLEST SERPL-MCNC: 151 MG/DL (ref 100–199)
CO2 SERPL-SCNC: 26 MMOL/L (ref 20–29)
COLOR UR: YELLOW
CREAT SERPL-MCNC: 1.04 MG/DL (ref 0.76–1.27)
EGFR: 83 ML/MIN/1.73
EOSINOPHIL # BLD AUTO: 0.2 X10E3/UL (ref 0–0.4)
EOSINOPHIL NFR BLD AUTO: 3 %
EPI CELLS #/AREA URNS HPF: NORMAL /HPF (ref 0–10)
ERYTHROCYTE [DISTWIDTH] IN BLOOD BY AUTOMATED COUNT: 18.8 % (ref 11.6–15.4)
GLOBULIN SER-MCNC: 2.6 G/DL (ref 1.5–4.5)
GLUCOSE SERPL-MCNC: 88 MG/DL (ref 70–99)
GLUCOSE UR QL: NEGATIVE
HBA1C MFR BLD: 5.5 % (ref 4.8–5.6)
HCT VFR BLD AUTO: 45.5 % (ref 37.5–51)
HDLC SERPL-MCNC: 60 MG/DL
HGB BLD-MCNC: 13.7 G/DL (ref 13–17.7)
HGB UR QL STRIP: NEGATIVE
IMM GRANULOCYTES # BLD: 0 X10E3/UL (ref 0–0.1)
IMM GRANULOCYTES NFR BLD: 0 %
KETONES UR QL STRIP: NEGATIVE
LDLC SERPL CALC-MCNC: 76 MG/DL (ref 0–99)
LEUKOCYTE ESTERASE UR QL STRIP: NEGATIVE
LYMPHOCYTES # BLD AUTO: 2.7 X10E3/UL (ref 0.7–3.1)
LYMPHOCYTES NFR BLD AUTO: 42 %
MCH RBC QN AUTO: 20.3 PG (ref 26.6–33)
MCHC RBC AUTO-ENTMCNC: 30.1 G/DL (ref 31.5–35.7)
MCV RBC AUTO: 68 FL (ref 79–97)
MICRO URNS: NORMAL
MICRO URNS: NORMAL
MONOCYTES # BLD AUTO: 0.6 X10E3/UL (ref 0.1–0.9)
MONOCYTES NFR BLD AUTO: 10 %
NEUTROPHILS # BLD AUTO: 2.9 X10E3/UL (ref 1.4–7)
NEUTROPHILS NFR BLD AUTO: 44 %
NITRITE UR QL STRIP: NEGATIVE
PH UR STRIP: 6.5 [PH] (ref 5–7.5)
PLATELET # BLD AUTO: 263 X10E3/UL (ref 150–450)
POTASSIUM SERPL-SCNC: 4.3 MMOL/L (ref 3.5–5.2)
PROT SERPL-MCNC: 7.1 G/DL (ref 6–8.5)
PROT UR QL STRIP: NEGATIVE
PSA SERPL-MCNC: 2.7 NG/ML (ref 0–4)
RBC # BLD AUTO: 6.74 X10E6/UL (ref 4.14–5.8)
RBC #/AREA URNS HPF: NORMAL /HPF (ref 0–2)
SL AMB URINALYSIS REFLEX: NORMAL
SL AMB VLDL CHOLESTEROL CALC: 15 MG/DL (ref 5–40)
SODIUM SERPL-SCNC: 143 MMOL/L (ref 134–144)
SP GR UR: 1.01 (ref 1–1.03)
TRIGL SERPL-MCNC: 79 MG/DL (ref 0–149)
TSH SERPL DL<=0.005 MIU/L-ACNC: 0.37 UIU/ML (ref 0.45–4.5)
UROBILINOGEN UR STRIP-ACNC: 0.2 MG/DL (ref 0.2–1)
WBC # BLD AUTO: 6.4 X10E3/UL (ref 3.4–10.8)
WBC #/AREA URNS HPF: NORMAL /HPF (ref 0–5)

## 2024-03-11 ENCOUNTER — OFFICE VISIT (OUTPATIENT)
Dept: FAMILY MEDICINE CLINIC | Facility: CLINIC | Age: 59
End: 2024-03-11
Payer: COMMERCIAL

## 2024-03-11 VITALS
WEIGHT: 198 LBS | SYSTOLIC BLOOD PRESSURE: 128 MMHG | DIASTOLIC BLOOD PRESSURE: 76 MMHG | BODY MASS INDEX: 31.82 KG/M2 | HEIGHT: 66 IN | OXYGEN SATURATION: 98 % | HEART RATE: 92 BPM

## 2024-03-11 DIAGNOSIS — N52.9 ERECTILE DYSFUNCTION, UNSPECIFIED ERECTILE DYSFUNCTION TYPE: ICD-10-CM

## 2024-03-11 DIAGNOSIS — I10 PRIMARY HYPERTENSION: ICD-10-CM

## 2024-03-11 DIAGNOSIS — E66.09 CLASS 1 OBESITY DUE TO EXCESS CALORIES WITHOUT SERIOUS COMORBIDITY WITH BODY MASS INDEX (BMI) OF 31.0 TO 31.9 IN ADULT: Primary | ICD-10-CM

## 2024-03-11 DIAGNOSIS — F51.01 PRIMARY INSOMNIA: ICD-10-CM

## 2024-03-11 DIAGNOSIS — R79.89 LOW TSH LEVEL: ICD-10-CM

## 2024-03-11 PROCEDURE — 99214 OFFICE O/P EST MOD 30 MIN: CPT | Performed by: INTERNAL MEDICINE

## 2024-03-11 RX ORDER — ZOLPIDEM TARTRATE 5 MG/1
5 TABLET ORAL
Qty: 30 TABLET | Refills: 0 | Status: SHIPPED | OUTPATIENT
Start: 2024-03-11

## 2024-03-11 RX ORDER — AMLODIPINE BESYLATE 5 MG/1
5 TABLET ORAL DAILY
Qty: 90 TABLET | Refills: 0 | Status: SHIPPED | OUTPATIENT
Start: 2024-03-11

## 2024-03-11 RX ORDER — SILDENAFIL 100 MG/1
100 TABLET, FILM COATED ORAL AS NEEDED
Qty: 12 TABLET | Refills: 2 | Status: SHIPPED | OUTPATIENT
Start: 2024-03-11

## 2024-03-11 NOTE — PROGRESS NOTES
Office Visit Note  24     Fernando Beasley 58 y.o. male MRN: [id number]  : 1965    Assessment:     1. Class 1 obesity due to excess calories without serious comorbidity with body mass index (BMI) of 31.0 to 31.9 in adult  Assessment & Plan:  Patient BMI is 31.96 about 6 pounds in last 5 months time.  Emphasized regarding diet exercise lifestyle modification lose weight      2. Erectile dysfunction, unspecified erectile dysfunction type  Assessment & Plan:  Patient is on sildenafil as needed    Orders:  -     sildenafil (VIAGRA) 100 mg tablet; Take 1 tablet (100 mg total) by mouth as needed for erectile dysfunction    3. Primary hypertension  Assessment & Plan:  Blood pressure is stable today is 128/76 currently patient taking amlodipine 5 mg daily will continue with the same.    Orders:  -     amLODIPine (NORVASC) 5 mg tablet; Take 1 tablet (5 mg total) by mouth daily    4. Primary insomnia  Assessment & Plan:  Sometimes patient has difficulty sleeping in the night occasionally takes Ambien 5 mg    Orders:  -     zolpidem (AMBIEN) 5 mg tablet; Take 1 tablet (5 mg total) by mouth daily at bedtime as needed for sleep    5. Low TSH level  Assessment & Plan:  Patient's TSH level is slightly low at 0.369 patient has no symptoms suggestive of hypothyroidism we will follow-up with repeat 1    Orders:  -     TSH, 3rd generation with Free T4 reflex; Future; Expected date: 2024          Tobacco Cessation Counseling: Tobacco cessation counseling was provided. The patient is sincerely urged to quit consumption of tobacco. He is ready to quit tobacco. Patient has already quit smoking about 1-1/2-year ago          Discussion Summary and Plan:  Today's care plan and medications were reviewed with patient in detail and all their questions answered to their satisfaction.    Chief Complaint   Patient presents with    Primary hypertension    Follow-up      Subjective:  Patient is coming here for a follow-up  evaluation with regard to his symptoms of hypertension, GERD, obesity, erectile dysfunction and also sometimes insomnia..  Denies any symptoms of chest pains palpitations and shortness of breath.  Medications reviewed labs reviewed.        The following portions of the patient's history were reviewed and updated as appropriate: allergies, current medications, past family history, past medical history, past social history, past surgical history and problem list.    Review of Systems   Constitutional:  Negative for chills and fever.   HENT:  Negative for ear pain and sore throat.    Eyes:  Negative for pain and visual disturbance.   Respiratory:  Negative for cough and shortness of breath.    Cardiovascular:  Negative for chest pain and palpitations.   Gastrointestinal:  Negative for abdominal pain and vomiting.   Genitourinary:  Negative for dysuria and hematuria.   Musculoskeletal:  Negative for arthralgias and back pain.   Skin:  Negative for color change and rash.   Neurological:  Negative for seizures and syncope.   All other systems reviewed and are negative.        Historical Information   Patient Active Problem List   Diagnosis    Primary hypertension    Gastroesophageal reflux disease without esophagitis    Erectile dysfunction    Class 1 obesity due to excess calories in adult    Primary insomnia    Chronic midline low back pain    Low TSH level     Past Medical History:   Diagnosis Date    Cellulitis     infection on abd x 2 mos    Dislocation of metatarsal joint of left foot 05/31/2023    GERD (gastroesophageal reflux disease)     Hypertension     Mass of abdomen     right    Ventral incisional hernia 09/18/2018     Past Surgical History:   Procedure Laterality Date    APPENDECTOMY  2008    CYST REMOVAL      chest    PA ESOPHAGOGASTRODUODENOSCOPY TRANSORAL DIAGNOSTIC N/A 8/28/2018    Procedure: ESOPHAGOGASTRODUODENOSCOPY (EGD);  Surgeon: Yaw Hamilton MD;  Location: [facility];  Service: General    PA  EXC TUMOR SOFT TISSUE ABDL WALL SUBFASCIAL <5CM Right 9/18/2018    Procedure: EXPLORATION OF WOUND, EXCISION OF LIPOMA R . HERNIA REPAIR OF RIGHT GROIN WITH MESH;  Surgeon: Yaw Hamilton MD;  Location: [facility];  Service: General     Social History     Substance and Sexual Activity   Alcohol Use No     Social History     Substance and Sexual Activity   Drug Use No     Social History     Tobacco Use   Smoking Status Some Days    Current packs/day: 0.50    Average packs/day: 0.5 packs/day for 25.0 years (12.5 ttl pk-yrs)    Types: Cigarettes   Smokeless Tobacco Never     Family History   Problem Relation Age of Onset    Stroke Mother      Health Maintenance Due   Topic    Hepatitis C Screening     Pneumococcal Vaccine: Pediatrics (0 to 5 Years) and At-Risk Patients (6 to 64 Years) (1 of 2 - PCV)    HIV Screening     Annual Physical     DTaP,Tdap,and Td Vaccines (1 - Tdap)    Zoster Vaccine (1 of 2)    Influenza Vaccine (1)    COVID-19 Vaccine (3 - 2023-24 season)    Depression Screening       Meds/Allergies       Current Outpatient Medications:     amLODIPine (NORVASC) 5 mg tablet, Take 1 tablet (5 mg total) by mouth daily, Disp: 90 tablet, Rfl: 0    baclofen 10 mg tablet, Take 1 tablet (10 mg total) by mouth 2 (two) times a day, Disp: 30 tablet, Rfl: 0    ibuprofen (MOTRIN) 600 mg tablet, TAKE 1 TABLET(600 MG) BY MOUTH EVERY 8 HOURS AS NEEDED FOR MILD PAIN, Disp: 30 tablet, Rfl: 2    Multiple Vitamins-Minerals (MENS 50+ ADVANCED PO), Take by mouth daily after lunch, Disp: , Rfl:     pantoprazole (PROTONIX) 40 mg tablet, Take 1 tablet (40 mg total) by mouth daily as needed (Heartburn), Disp: 90 tablet, Rfl: 2    sildenafil (VIAGRA) 100 mg tablet, Take 1 tablet (100 mg total) by mouth as needed for erectile dysfunction, Disp: 12 tablet, Rfl: 2    zolpidem (AMBIEN) 5 mg tablet, Take 1 tablet (5 mg total) by mouth daily at bedtime as needed for sleep, Disp: 30 tablet, Rfl: 0      Objective:    Vitals:   /76 (BP  "Location: Right arm, Patient Position: Sitting, Cuff Size: Standard)   Pulse 92   Ht 5' 6\" (1.676 m)   Wt 89.8 kg (198 lb)   SpO2 98%   BMI 31.96 kg/m²   Body mass index is 31.96 kg/m².  Vitals:    03/11/24 1602   Weight: 89.8 kg (198 lb)       Physical Exam  Vitals and nursing note reviewed.   Constitutional:       Appearance: Normal appearance. He is obese.   Cardiovascular:      Rate and Rhythm: Normal rate and regular rhythm.      Heart sounds: Normal heart sounds.   Pulmonary:      Effort: Pulmonary effort is normal.      Breath sounds: Normal breath sounds.   Abdominal:      Palpations: Abdomen is soft.   Musculoskeletal:         General: Normal range of motion.      Right lower leg: No edema.      Left lower leg: No edema.   Skin:     General: Skin is warm and dry.   Neurological:      Mental Status: He is alert and oriented to person, place, and time.   Psychiatric:         Mood and Affect: Mood normal.         Behavior: Behavior normal.         Lab Review   Orders Only on 03/05/2024   Component Date Value Ref Range Status    White Blood Cell Count 03/05/2024 6.4  3.4 - 10.8 x10E3/uL Final    Red Blood Cell Count 03/05/2024 6.74 (H)  4.14 - 5.80 x10E6/uL Final    Hemoglobin 03/05/2024 13.7  13.0 - 17.7 g/dL Final    HCT 03/05/2024 45.5  37.5 - 51.0 % Final    MCV 03/05/2024 68 (L)  79 - 97 fL Final    MCH 03/05/2024 20.3 (L)  26.6 - 33.0 pg Final    MCHC 03/05/2024 30.1 (L)  31.5 - 35.7 g/dL Final    RDW 03/05/2024 18.8 (H)  11.6 - 15.4 % Final    Platelet Count 03/05/2024 263  150 - 450 x10E3/uL Final    Neutrophils 03/05/2024 44  Not Estab. % Final    Lymphocytes 03/05/2024 42  Not Estab. % Final    Monocytes 03/05/2024 10  Not Estab. % Final    Eosinophils 03/05/2024 3  Not Estab. % Final    Basophils PCT 03/05/2024 1  Not Estab. % Final    Neutrophils (Absolute) 03/05/2024 2.9  1.4 - 7.0 x10E3/uL Final    Lymphocytes (Absolute) 03/05/2024 2.7  0.7 - 3.1 x10E3/uL Final    Monocytes (Absolute) " 03/05/2024 0.6  0.1 - 0.9 x10E3/uL Final    Eosinophils (Absolute) 03/05/2024 0.2  0.0 - 0.4 x10E3/uL Final    Basophils ABS 03/05/2024 0.1  0.0 - 0.2 x10E3/uL Final    Immature Granulocytes 03/05/2024 0  Not Estab. % Final    Immature Granulocytes (Absolute) 03/05/2024 0.0  0.0 - 0.1 x10E3/uL Final    Comment: A hand-written panel/profile was received from your office. In  accordance with the [facility] Ambiguous Test Code Policy dated July 2003, we have assigned CBC with Differential/Platelet, Test Code  #000320 to this request. If this is not the testing you wished to  receive on this specimen, please contact the [facility] Tech Client Inquiry/  Technical Services Department to clarify the test order. We  appreciate your business.      Glucose, Random 03/05/2024 88  70 - 99 mg/dL Final    BUN 03/05/2024 12  6 - 24 mg/dL Final    Creatinine 03/05/2024 1.04  0.76 - 1.27 mg/dL Final    eGFR 03/05/2024 83  >59 mL/min/1.73 Final    SL AMB BUN/CREATININE RATIO 03/05/2024 12  9 - 20 Final    Sodium 03/05/2024 143  134 - 144 mmol/L Final    Potassium 03/05/2024 4.3  3.5 - 5.2 mmol/L Final    Chloride 03/05/2024 104  96 - 106 mmol/L Final    CO2 03/05/2024 26  20 - 29 mmol/L Final    CALCIUM 03/05/2024 8.9  8.7 - 10.2 mg/dL Final    Protein, Total 03/05/2024 7.1  6.0 - 8.5 g/dL Final    Albumin 03/05/2024 4.5  3.8 - 4.9 g/dL Final    Globulin, Total 03/05/2024 2.6  1.5 - 4.5 g/dL Final    Albumin/Globulin Ratio 03/05/2024 1.7  1.2 - 2.2 Final    TOTAL BILIRUBIN 03/05/2024 0.6  0.0 - 1.2 mg/dL Final    Alk Phos Isoenzymes 03/05/2024 87  44 - 121 IU/L Final    AST 03/05/2024 23  0 - 40 IU/L Final    ALT 03/05/2024 38  0 - 44 IU/L Final    Specific Gravity 03/05/2024 1.015  1.005 - 1.030 Final    Ph 03/05/2024 6.5  5.0 - 7.5 Final    Color UA 03/05/2024 Yellow  Yellow Final    Urine Appearance 03/05/2024 Clear  Clear Final    Leukocyte Esterase 03/05/2024 Negative  Negative Final    Protein 03/05/2024 Negative  Negative/Trace Final     Glucose, 24 HR Urine 03/05/2024 Negative  Negative Final    Ketone, Urine 03/05/2024 Negative  Negative Final    Blood, Urine 03/05/2024 Negative  Negative Final    Bilirubin, Urine 03/05/2024 Negative  Negative Final    Urobilinogen Urine 03/05/2024 0.2  0.2 - 1.0 mg/dL Final    Nitrites Urine 03/05/2024 Negative  Negative Final    Microscopic Examination 03/05/2024 Comment   Final    Microscopic follows if indicated.    Microscopic Examination 03/05/2024 See below:   Final    Microscopic was indicated and was performed.    Urinalysis Reflex 03/05/2024 Comment   Final    This specimen will not reflex to a Urine Culture.    SL AMB WBC, URINE 03/05/2024 None seen  0 - 5 /hpf Final    RBC, Urine 03/05/2024 None seen  0 - 2 /hpf Final    Epithelial Cells (non renal) 03/05/2024 None seen  0 - 10 /hpf Final    Casts 03/05/2024 None seen  None seen /lpf Final    Bacteria, Urine 03/05/2024 None seen  None seen/Few Final    Cholesterol, Total 03/05/2024 151  100 - 199 mg/dL Final    Triglycerides 03/05/2024 79  0 - 149 mg/dL Final    HDL 03/05/2024 60  >39 mg/dL Final    VLDL Cholesterol Calculated 03/05/2024 15  5 - 40 mg/dL Final    LDL Calculated 03/05/2024 76  0 - 99 mg/dL Final    Hemoglobin A1C 03/05/2024 5.5  4.8 - 5.6 % Final    Comment:          Prediabetes: 5.7 - 6.4           Diabetes: >6.4           Glycemic control for adults with diabetes: <7.0      Prostate Specific Antigen Total 03/05/2024 2.7  0.0 - 4.0 ng/mL Final    Comment: Roche ECLIA methodology.  According to the American Urological Association, Serum PSA should  decrease and remain at undetectable levels after radical  prostatectomy. The AUA defines biochemical recurrence as an initial  PSA value 0.2 ng/mL or greater followed by a subsequent confirmatory  PSA value 0.2 ng/mL or greater.  Values obtained with different assay methods or kits cannot be used  interchangeably. Results cannot be interpreted as absolute evidence  of the presence or absence  "of malignant disease.      TSH 03/05/2024 0.369 (L)  0.450 - 4.500 uIU/mL Final         Jessica Fatima MD        \"This note has been constructed using a voice recognition system.Therefore there may be syntax, spelling, and/or grammatical errors. Please call if you have any questions. \"  "

## 2024-03-11 NOTE — ASSESSMENT & PLAN NOTE
Patient's TSH level is slightly low at 0.369 patient has no symptoms suggestive of hypothyroidism we will follow-up with repeat 1

## 2024-03-11 NOTE — ASSESSMENT & PLAN NOTE
Patient BMI is 31.96 about 6 pounds in last 5 months time.  Emphasized regarding diet exercise lifestyle modification lose weight

## 2024-03-11 NOTE — ASSESSMENT & PLAN NOTE
Patient takes pantoprazole only as needed.  He is trying to avoid spicy food eating late in the night

## 2024-03-11 NOTE — ASSESSMENT & PLAN NOTE
Blood pressure is stable today is 128/76 currently patient taking amlodipine 5 mg daily will continue with the same.

## 2024-03-18 ENCOUNTER — TELEPHONE (OUTPATIENT)
Dept: FAMILY MEDICINE CLINIC | Facility: CLINIC | Age: 59
End: 2024-03-18

## 2024-04-15 DIAGNOSIS — K31.9 LESION OF STOMACH: ICD-10-CM

## 2024-04-15 RX ORDER — PANTOPRAZOLE SODIUM 40 MG/1
40 TABLET, DELAYED RELEASE ORAL DAILY
Qty: 90 TABLET | Refills: 1 | Status: SHIPPED | OUTPATIENT
Start: 2024-04-15

## 2024-04-22 ENCOUNTER — NURSE TRIAGE (OUTPATIENT)
Age: 59
End: 2024-04-22

## 2024-04-22 ENCOUNTER — OFFICE VISIT (OUTPATIENT)
Dept: FAMILY MEDICINE CLINIC | Facility: CLINIC | Age: 59
End: 2024-04-22
Payer: COMMERCIAL

## 2024-04-22 VITALS
BODY MASS INDEX: 32.4 KG/M2 | HEART RATE: 75 BPM | OXYGEN SATURATION: 95 % | HEIGHT: 66 IN | SYSTOLIC BLOOD PRESSURE: 126 MMHG | DIASTOLIC BLOOD PRESSURE: 74 MMHG | WEIGHT: 201.6 LBS

## 2024-04-22 DIAGNOSIS — G89.29 CHRONIC MIDLINE LOW BACK PAIN WITHOUT SCIATICA: Primary | ICD-10-CM

## 2024-04-22 DIAGNOSIS — F51.01 PRIMARY INSOMNIA: ICD-10-CM

## 2024-04-22 DIAGNOSIS — E66.09 CLASS 1 OBESITY DUE TO EXCESS CALORIES WITHOUT SERIOUS COMORBIDITY WITH BODY MASS INDEX (BMI) OF 31.0 TO 31.9 IN ADULT: ICD-10-CM

## 2024-04-22 DIAGNOSIS — N39.0 URINARY TRACT INFECTION WITHOUT HEMATURIA, SITE UNSPECIFIED: ICD-10-CM

## 2024-04-22 DIAGNOSIS — R79.89 LOW TSH LEVEL: ICD-10-CM

## 2024-04-22 DIAGNOSIS — M54.50 CHRONIC MIDLINE LOW BACK PAIN WITHOUT SCIATICA: Primary | ICD-10-CM

## 2024-04-22 DIAGNOSIS — K21.9 GASTROESOPHAGEAL REFLUX DISEASE WITHOUT ESOPHAGITIS: ICD-10-CM

## 2024-04-22 DIAGNOSIS — I10 PRIMARY HYPERTENSION: ICD-10-CM

## 2024-04-22 DIAGNOSIS — N52.9 ERECTILE DYSFUNCTION, UNSPECIFIED ERECTILE DYSFUNCTION TYPE: ICD-10-CM

## 2024-04-22 PROCEDURE — 99214 OFFICE O/P EST MOD 30 MIN: CPT | Performed by: INTERNAL MEDICINE

## 2024-04-22 NOTE — TELEPHONE ENCOUNTER
Regarding: severe back pain on right side/very yellow urine  ----- Message from Teetee Gonzalez sent at 4/22/2024 10:25 AM EDT -----  Patient called in stating he is having severe back pain on his right side possible kidney area. Patient stated his urine has been very yellow but unsure what could be wrong. Started 7 days ago. Please advise.

## 2024-04-22 NOTE — ASSESSMENT & PLAN NOTE
Patient had developed pain in the right side of body and neck wrist area increasing with movements no urinary symptoms no CVA tenderness will recommend patient ibuprofen as well as cyclobenzaprine muscle relaxer which she has them at home.  Recommend to take pantoprazole while he is taking ibuprofen.  Will also get urine analysis and culture.  Also patient can use a heating pad

## 2024-04-22 NOTE — TELEPHONE ENCOUNTER
"Pt has been having right flank pain for the past week. Pt states it is intermittent. Pain varies from 3/10 to 8/10. Pt is not having any urinary symptoms. Denies fever, chest pain, SOB. Pt has office appt today, 4/22.    Reason for Disposition   MODERATE pain (e.g., interferes with normal activities or awakens from sleep)    Answer Assessment - Initial Assessment Questions  1. LOCATION: \"Where does it hurt?\" (e.g., left, right)      Right hand lower back pain.   2. ONSET: \"When did the pain start?\"      7 days ago  3. SEVERITY: \"How bad is the pain?\" (e.g., Scale 1-10; mild, moderate, or severe)    - MILD (1-3): doesn't interfere with normal activities     - MODERATE (4-7): interferes with normal activities or awakens from sleep     - SEVERE (8-10): excruciating pain and patient unable to do normal activities (stays in bed)        Moderate  4. PATTERN: \"Does the pain come and go, or is it constant?\"       Intermittent  5. CAUSE: \"What do you think is causing the pain?\"      Unsure  6. OTHER SYMPTOMS:  \"Do you have any other symptoms?\" (e.g., fever, abdominal pain, vomiting, leg weakness, burning with urination, blood in urine)      Denies  7. PREGNANCY:  \"Is there any chance you are pregnant?\" \"When was your last menstrual period?\"      NA    Protocols used: Flank Pain-ADULT-[place]    "

## 2024-04-22 NOTE — PROGRESS NOTES
Name: Fernando Beasley      : 1965      MRN: [id number]  Encounter Provider: Jessica Fatima MD  Encounter Date: 2024   Encounter department: [facility] [place]    Assessment & Plan     1. Chronic midline low back pain without sciatica  Assessment & Plan:  Patient had developed pain in the right side of body and neck wrist area increasing with movements no urinary symptoms no CVA tenderness will recommend patient ibuprofen as well as cyclobenzaprine muscle relaxer which she has them at home.  Recommend to take pantoprazole while he is taking ibuprofen.  Will also get urine analysis and culture.  Also patient can use a heating pad      2. Class 1 obesity due to excess calories without serious comorbidity with body mass index (BMI) of 31.0 to 31.9 in adult  Assessment & Plan:  Recommend very strongly lifestyle modification diet exercise lose weight today his BMI is 32.54      3. Erectile dysfunction, unspecified erectile dysfunction type    4. Gastroesophageal reflux disease without esophagitis  Assessment & Plan:  Continue pantoprazole as needed especially when he is taking Aleve ibuprofen      5. Low TSH level  Assessment & Plan:  Follow-up with repeat TSH level      6. Primary hypertension  Assessment & Plan:  Blood pressure is stable 126/74 continue amlodipine 5 mg daily      7. Primary insomnia    8. Urinary tract infection without hematuria, site unspecified  -     UA w Reflex to Microscopic w Reflex to Culture; Future           Subjective      Patient is coming here for evaluation regarding symptoms of pain discomfort in the right side back area above the iliac crest.  He has been doing a lot of gardening in the recent past and subsequently noticed after 2 days of increasing discomfort pain sometimes with movements no urinary symptoms however his urine was concentrated in the no blood that he has noticed.  No pain in the left side.  Medications reviewed labs reviewed.  Patient with GERD  "symptoms got better with pantoprazole he is now taking only as needed.      Review of Systems   Constitutional:  Negative for chills and fever.   HENT:  Negative for ear pain and sore throat.    Eyes:  Negative for pain and visual disturbance.   Respiratory:  Negative for cough and shortness of breath.    Cardiovascular:  Negative for chest pain and palpitations.   Gastrointestinal:  Negative for abdominal pain and vomiting.   Genitourinary:  Negative for dysuria and hematuria.   Musculoskeletal:  Positive for back pain. Negative for arthralgias.   Skin:  Negative for color change and rash.   Neurological:  Negative for seizures and syncope.   All other systems reviewed and are negative.      Current Outpatient Medications on File Prior to Visit   Medication Sig   • amLODIPine (NORVASC) 5 mg tablet Take 1 tablet (5 mg total) by mouth daily   • baclofen 10 mg tablet Take 1 tablet (10 mg total) by mouth 2 (two) times a day   • ibuprofen (MOTRIN) 600 mg tablet TAKE 1 TABLET(600 MG) BY MOUTH EVERY 8 HOURS AS NEEDED FOR MILD PAIN   • Multiple Vitamins-Minerals (MENS 50+ ADVANCED PO) Take by mouth daily after lunch   • sildenafil (VIAGRA) 100 mg tablet Take 1 tablet (100 mg total) by mouth as needed for erectile dysfunction   • zolpidem (AMBIEN) 5 mg tablet Take 1 tablet (5 mg total) by mouth daily at bedtime as needed for sleep   • pantoprazole (PROTONIX) 40 mg tablet TAKE 1 TABLET(40 MG) BY MOUTH DAILY (Patient not taking: Reported on 4/22/2024)       Objective     /74 (BP Location: Right arm, Patient Position: Sitting, Cuff Size: Standard)   Pulse 75   Ht 5' 6\" (1.676 m)   Wt 91.4 kg (201 lb 9.6 oz)   SpO2 95%   BMI 32.54 kg/m²     Physical Exam  Vitals and nursing note reviewed.   Constitutional:       Appearance: Normal appearance.   Cardiovascular:      Rate and Rhythm: Normal rate and regular rhythm.      Heart sounds: Normal heart sounds.   Pulmonary:      Effort: Pulmonary effort is normal.      Breath " sounds: Normal breath sounds.   Abdominal:      Palpations: Abdomen is soft.   Musculoskeletal:      Right lower leg: No edema.      Left lower leg: No edema.      Comments: Patient experiencing pain with movements of the spine especially above the right iliac crest area   Skin:     General: Skin is warm and dry.   Neurological:      Mental Status: He is alert and oriented to person, place, and time.       Recent Results (from the past 1344 hour(s))   Trina Gresham Default    Collection Time: 03/05/24  7:07 AM   Result Value Ref Range    White Blood Cell Count 6.4 3.4 - 10.8 x10E3/uL    Red Blood Cell Count 6.74 (H) 4.14 - 5.80 x10E6/uL    Hemoglobin 13.7 13.0 - 17.7 g/dL    HCT 45.5 37.5 - 51.0 %    MCV 68 (L) 79 - 97 fL    MCH 20.3 (L) 26.6 - 33.0 pg    MCHC 30.1 (L) 31.5 - 35.7 g/dL    RDW 18.8 (H) 11.6 - 15.4 %    Platelet Count 263 150 - 450 x10E3/uL    Neutrophils 44 Not Estab. %    Lymphocytes 42 Not Estab. %    Monocytes 10 Not Estab. %    Eosinophils 3 Not Estab. %    Basophils PCT 1 Not Estab. %    Neutrophils (Absolute) 2.9 1.4 - 7.0 x10E3/uL    Lymphocytes (Absolute) 2.7 0.7 - 3.1 x10E3/uL    Monocytes (Absolute) 0.6 0.1 - 0.9 x10E3/uL    Eosinophils (Absolute) 0.2 0.0 - 0.4 x10E3/uL    Basophils ABS 0.1 0.0 - 0.2 x10E3/uL    Immature Granulocytes 0 Not Estab. %    Immature Granulocytes (Absolute) 0.0 0.0 - 0.1 x10E3/uL   Comprehensive metabolic panel    Collection Time: 03/05/24  7:07 AM   Result Value Ref Range    Glucose, Random 88 70 - 99 mg/dL    BUN 12 6 - 24 mg/dL    Creatinine 1.04 0.76 - 1.27 mg/dL    eGFR 83 >59 mL/min/1.73    SL AMB BUN/CREATININE RATIO 12 9 - 20    Sodium 143 134 - 144 mmol/L    Potassium 4.3 3.5 - 5.2 mmol/L    Chloride 104 96 - 106 mmol/L    CO2 26 20 - 29 mmol/L    CALCIUM 8.9 8.7 - 10.2 mg/dL    Protein, Total 7.1 6.0 - 8.5 g/dL    Albumin 4.5 3.8 - 4.9 g/dL    Globulin, Total 2.6 1.5 - 4.5 g/dL    Albumin/Globulin Ratio 1.7 1.2 - 2.2    TOTAL BILIRUBIN 0.6 0.0 - 1.2 mg/dL     Alk Phos Isoenzymes 87 44 - 121 IU/L    AST 23 0 - 40 IU/L    ALT 38 0 - 44 IU/L   UA/M w/rflx Culture, Routine    Collection Time: 03/05/24  7:07 AM   Result Value Ref Range    Specific Gravity 1.015 1.005 - 1.030    Ph 6.5 5.0 - 7.5    Color UA Yellow Yellow    Urine Appearance Clear Clear    Leukocyte Esterase Negative Negative    Protein Negative Negative/Trace    Glucose, 24 HR Urine Negative Negative    Ketone, Urine Negative Negative    Blood, Urine Negative Negative    Bilirubin, Urine Negative Negative    Urobilinogen Urine 0.2 0.2 - 1.0 mg/dL    Nitrites Urine Negative Negative    Microscopic Examination Comment     Microscopic Examination See below:     Urinalysis Reflex Comment    Microscopic Examination    Collection Time: 03/05/24  7:07 AM   Result Value Ref Range    SL AMB WBC, URINE None seen 0 - 5 /hpf    RBC, Urine None seen 0 - 2 /hpf    Epithelial Cells (non renal) None seen 0 - 10 /hpf    Casts None seen None seen /lpf    Bacteria, Urine None seen None seen/Few   Lipid panel    Collection Time: 03/05/24  7:07 AM   Result Value Ref Range    Cholesterol, Total 151 100 - 199 mg/dL    Triglycerides 79 0 - 149 mg/dL    HDL 60 >39 mg/dL    VLDL Cholesterol Calculated 15 5 - 40 mg/dL    LDL Calculated 76 0 - 99 mg/dL   Hemoglobin A1c (w/out EAG)    Collection Time: 03/05/24  7:07 AM   Result Value Ref Range    Hemoglobin A1C 5.5 4.8 - 5.6 %   PSA Total, Diagnostic    Collection Time: 03/05/24  7:07 AM   Result Value Ref Range    Prostate Specific Antigen Total 2.7 0.0 - 4.0 ng/mL   TSH, 3rd generation with Free T4 reflex    Collection Time: 03/05/24  7:07 AM   Result Value Ref Range    TSH 0.369 (L) 0.450 - 4.500 uIU/mL      Jessica Fatima MD

## 2024-04-24 LAB
APPEARANCE UR: CLEAR
BACTERIA URNS QL MICRO: NORMAL
BILIRUB UR QL STRIP: NEGATIVE
CASTS URNS QL MICRO: NORMAL /LPF
COLOR UR: YELLOW
EPI CELLS #/AREA URNS HPF: NORMAL /HPF (ref 0–10)
GLUCOSE UR QL: NEGATIVE
HGB UR QL STRIP: NEGATIVE
KETONES UR QL STRIP: NEGATIVE
LEUKOCYTE ESTERASE UR QL STRIP: NEGATIVE
MICRO URNS: NORMAL
MICRO URNS: NORMAL
NITRITE UR QL STRIP: NEGATIVE
PH UR STRIP: 5.5 [PH] (ref 5–7.5)
PROT UR QL STRIP: NORMAL
RBC #/AREA URNS HPF: NORMAL /HPF (ref 0–2)
SL AMB URINALYSIS REFLEX: NORMAL
SP GR UR: 1.03 (ref 1–1.03)
UROBILINOGEN UR STRIP-ACNC: 0.2 MG/DL (ref 0.2–1)
WBC #/AREA URNS HPF: NORMAL /HPF (ref 0–5)

## 2024-06-11 NOTE — PROGRESS NOTES
Office Visit Note  24     Fernando Beasley 58 y.o. male MRN: [id number]  : 1965    Assessment:     1. Low TSH level  Assessment & Plan:  Patient has a low TSH level I ordered daily TSH level again but not done we will repeat it  2. Acute right-sided low back pain without sciatica  Assessment & Plan:  Pain is resolved however on and off he still gets some mild discomfort feeling will renew his baclofen to be taken as needed  Orders:  -     baclofen 10 mg tablet; Take 1 tablet (10 mg total) by mouth 2 (two) times a day  3. Primary insomnia  Assessment & Plan:  Discussed with patient regarding insomnia and tips to help with sleeping will prescribe Ambien only to be taken as needed.  Orders:  -     zolpidem (AMBIEN) 5 mg tablet; Take 1 tablet (5 mg total) by mouth daily at bedtime as needed for sleep  4. Primary hypertension  Assessment & Plan:  Blood pressure today 122/73 continue with amlodipine 5 mg daily  5. Class 1 obesity due to excess calories without serious comorbidity with body mass index (BMI) of 32.0 to 32.9 in adult  Assessment & Plan:  Patient BMI is 32.44 emphasized again regarding diet exercise lifestyle modification to lose weight  6. Gastroesophageal reflux disease without esophagitis  Assessment & Plan:  Patient is taking pantoprazole as needed will continue with the same recommend patient avoid spicy food and not to eat late in the night and keep the head end of the bed up  7. Lesion of stomach  -     pantoprazole (PROTONIX) 40 mg tablet; Take 1 tablet (40 mg total) by mouth daily as needed (GERD)  8. Erectile dysfunction, unspecified erectile dysfunction type  Assessment & Plan:  Continue sildenafil as needed  9. Hypothyroidism, unspecified type  -     TSH, 3rd generation with Free T4 reflex; Future; Expected date: 2024  -     TSH, 3rd generation with Free T4 reflex             Discussion Summary and Plan:  Today's care plan and medications were reviewed with patient in detail and  all their questions answered to their satisfaction.    Chief Complaint   Patient presents with   • Follow-up      Subjective:  Patient is coming here for a follow-up evaluation with regards to his symptoms of hypertension, history of low back pain last blood test that showed a TSH level on the lower side repeat 1 was ordered not done yet.  Patient low back pain is much better after taking ibuprofen and muscle relaxers Flexeril.  Denies any symptoms of tingling numbness no chest pain palpitations or shortness of breath..  Medications reviewed labs reviewed.        The following portions of the patient's history were reviewed and updated as appropriate: allergies, current medications, past family history, past medical history, past social history, past surgical history and problem list.    Review of Systems   Constitutional:  Negative for chills and fever.   HENT:  Negative for ear pain and sore throat.    Eyes:  Negative for pain and visual disturbance.   Respiratory:  Negative for cough and shortness of breath.    Cardiovascular:  Negative for chest pain and palpitations.   Gastrointestinal:  Negative for abdominal pain and vomiting.   Genitourinary:  Negative for dysuria and hematuria.   Musculoskeletal:  Negative for arthralgias and back pain.   Skin:  Negative for color change and rash.   Neurological:  Negative for seizures and syncope.   All other systems reviewed and are negative.        Historical Information   Patient Active Problem List   Diagnosis   • Primary hypertension   • Gastroesophageal reflux disease without esophagitis   • Erectile dysfunction   • Class 1 obesity due to excess calories in adult   • Primary insomnia   • Acute right-sided low back pain without sciatica   • Low TSH level     Past Medical History:   Diagnosis Date   • Cellulitis     infection on abd x 2 mos   • Dislocation of metatarsal joint of left foot 05/31/2023   • GERD (gastroesophageal reflux disease)    • Hypertension    • Mass  of abdomen     right   • Ventral incisional hernia 09/18/2018     Past Surgical History:   Procedure Laterality Date   • APPENDECTOMY  2008   • CYST REMOVAL      chest   • RI ESOPHAGOGASTRODUODENOSCOPY TRANSORAL DIAGNOSTIC N/A 8/28/2018    Procedure: ESOPHAGOGASTRODUODENOSCOPY (EGD);  Surgeon: Yaw Hamilton MD;  Location: [facility] GI LAB;  Service: General   • RI EXC TUMOR SOFT TISSUE ABDL WALL SUBFASCIAL <5CM Right 9/18/2018    Procedure: EXPLORATION OF WOUND, EXCISION OF LIPOMA R . HERNIA REPAIR OF RIGHT GROIN WITH MESH;  Surgeon: Yaw Hamilton MD;  Location: [facility] OR;  Service: General     Social History     Substance and Sexual Activity   Alcohol Use No     Social History     Substance and Sexual Activity   Drug Use No     Social History     Tobacco Use   Smoking Status Some Days   • Current packs/day: 0.50   • Average packs/day: 0.5 packs/day for 25.0 years (12.5 ttl pk-yrs)   • Types: Cigarettes   Smokeless Tobacco Never     Family History   Problem Relation Age of Onset   • Stroke Mother      Health Maintenance Due   Topic   • Hepatitis C Screening    • Pneumococcal Vaccine: Pediatrics (0 to 5 Years) and At-Risk Patients (6 to 64 Years) (1 of 2 - PCV)   • HIV Screening    • Annual Physical    • DTaP,Tdap,and Td Vaccines (1 - Tdap)   • Zoster Vaccine (1 of 2)   • COVID-19 Vaccine (3 - 2023-24 season)   • Influenza Vaccine (Season Ended)      Meds/Allergies       Current Outpatient Medications:   •  amLODIPine (NORVASC) 5 mg tablet, Take 1 tablet (5 mg total) by mouth daily, Disp: 90 tablet, Rfl: 0  •  baclofen 10 mg tablet, Take 1 tablet (10 mg total) by mouth 2 (two) times a day, Disp: 30 tablet, Rfl: 0  •  ibuprofen (MOTRIN) 600 mg tablet, TAKE 1 TABLET(600 MG) BY MOUTH EVERY 8 HOURS AS NEEDED FOR MILD PAIN, Disp: 30 tablet, Rfl: 2  •  Multiple Vitamins-Minerals (MENS 50+ ADVANCED PO), Take by mouth daily after lunch, Disp: , Rfl:   •  pantoprazole (PROTONIX) 40 mg tablet, Take 1 tablet (40 mg total) by  "mouth daily as needed (GERD), Disp: , Rfl:   •  sildenafil (VIAGRA) 100 mg tablet, Take 1 tablet (100 mg total) by mouth as needed for erectile dysfunction, Disp: 12 tablet, Rfl: 2  •  zolpidem (AMBIEN) 5 mg tablet, Take 1 tablet (5 mg total) by mouth daily at bedtime as needed for sleep, Disp: 30 tablet, Rfl: 0      Objective:    Vitals:   /73 (BP Location: Right arm, Patient Position: Sitting, Cuff Size: Standard)   Pulse 94   Ht 5' 6\" (1.676 m)   Wt 91.2 kg (201 lb)   SpO2 97%   BMI 32.44 kg/m²   Body mass index is 32.44 kg/m².  Vitals:    06/12/24 1254   Weight: 91.2 kg (201 lb)       Physical Exam  Vitals and nursing note reviewed.   Constitutional:       Appearance: Normal appearance. He is obese.   Cardiovascular:      Rate and Rhythm: Normal rate and regular rhythm.      Heart sounds: Normal heart sounds.   Pulmonary:      Effort: Pulmonary effort is normal.      Breath sounds: Normal breath sounds.   Abdominal:      General: Abdomen is flat.      Palpations: Abdomen is soft.   Musculoskeletal:         General: Normal range of motion.      Right lower leg: No edema.      Left lower leg: No edema.   Skin:     General: Skin is warm and dry.   Neurological:      General: No focal deficit present.      Mental Status: He is alert and oriented to person, place, and time.   Psychiatric:         Mood and Affect: Mood normal.         Behavior: Behavior normal.         Lab Review   Orders Only on 04/23/2024   Component Date Value Ref Range Status   • Specific Gravity 04/23/2024 1.027  1.005 - 1.030 Final   • Ph 04/23/2024 5.5  5.0 - 7.5 Final   • Color UA 04/23/2024 Yellow  Yellow Final   • Urine Appearance 04/23/2024 Clear  Clear Final   • Leukocyte Esterase 04/23/2024 Negative  Negative Final   • Protein 04/23/2024 Trace  Negative/Trace Final   • Glucose, 24 HR Urine 04/23/2024 Negative  Negative Final   • Ketone, Urine 04/23/2024 Negative  Negative Final   • Blood, Urine 04/23/2024 Negative  Negative " "Final   • Bilirubin, Urine 04/23/2024 Negative  Negative Final   • Urobilinogen Urine 04/23/2024 0.2  0.2 - 1.0 mg/dL Final   • Nitrites Urine 04/23/2024 Negative  Negative Final   • Microscopic Examination 04/23/2024 Comment   Final    Microscopic follows if indicated.   • Microscopic Examination 04/23/2024 See below:   Final    Microscopic was indicated and was performed.   • Urinalysis Reflex 04/23/2024 Comment   Final    This specimen will not reflex to a Urine Culture.   • SL AMB WBC, URINE 04/23/2024 None seen  0 - 5 /hpf Final   • RBC, Urine 04/23/2024 None seen  0 - 2 /hpf Final   • Epithelial Cells (non renal) 04/23/2024 0-10  0 - 10 /hpf Final   • Casts 04/23/2024 None seen  None seen /lpf Final   • Bacteria, Urine 04/23/2024 None seen  None seen/Few Final         Jessica Fatima MD        \"This note has been constructed using a voice recognition system.Therefore there may be syntax, spelling, and/or grammatical errors. Please call if you have any questions. \"  "

## 2024-06-12 ENCOUNTER — OFFICE VISIT (OUTPATIENT)
Dept: FAMILY MEDICINE CLINIC | Facility: CLINIC | Age: 59
End: 2024-06-12
Payer: COMMERCIAL

## 2024-06-12 VITALS
HEIGHT: 66 IN | OXYGEN SATURATION: 97 % | DIASTOLIC BLOOD PRESSURE: 73 MMHG | BODY MASS INDEX: 32.3 KG/M2 | WEIGHT: 201 LBS | SYSTOLIC BLOOD PRESSURE: 122 MMHG | HEART RATE: 94 BPM

## 2024-06-12 DIAGNOSIS — I10 PRIMARY HYPERTENSION: ICD-10-CM

## 2024-06-12 DIAGNOSIS — E66.09 CLASS 1 OBESITY DUE TO EXCESS CALORIES WITHOUT SERIOUS COMORBIDITY WITH BODY MASS INDEX (BMI) OF 32.0 TO 32.9 IN ADULT: ICD-10-CM

## 2024-06-12 DIAGNOSIS — K21.9 GASTROESOPHAGEAL REFLUX DISEASE WITHOUT ESOPHAGITIS: ICD-10-CM

## 2024-06-12 DIAGNOSIS — K31.9 LESION OF STOMACH: ICD-10-CM

## 2024-06-12 DIAGNOSIS — R79.89 LOW TSH LEVEL: Primary | ICD-10-CM

## 2024-06-12 DIAGNOSIS — M54.50 ACUTE RIGHT-SIDED LOW BACK PAIN WITHOUT SCIATICA: ICD-10-CM

## 2024-06-12 DIAGNOSIS — N52.9 ERECTILE DYSFUNCTION, UNSPECIFIED ERECTILE DYSFUNCTION TYPE: ICD-10-CM

## 2024-06-12 DIAGNOSIS — F51.01 PRIMARY INSOMNIA: ICD-10-CM

## 2024-06-12 DIAGNOSIS — E03.9 HYPOTHYROIDISM, UNSPECIFIED TYPE: ICD-10-CM

## 2024-06-12 PROCEDURE — 99214 OFFICE O/P EST MOD 30 MIN: CPT | Performed by: INTERNAL MEDICINE

## 2024-06-12 RX ORDER — PANTOPRAZOLE SODIUM 40 MG/1
40 TABLET, DELAYED RELEASE ORAL DAILY PRN
Status: SHIPPED
Start: 2024-06-12

## 2024-06-12 RX ORDER — ZOLPIDEM TARTRATE 5 MG/1
5 TABLET ORAL
Qty: 30 TABLET | Refills: 0 | Status: SHIPPED | OUTPATIENT
Start: 2024-06-12

## 2024-06-12 RX ORDER — BACLOFEN 10 MG/1
10 TABLET ORAL 2 TIMES DAILY
Qty: 30 TABLET | Refills: 0 | Status: SHIPPED | OUTPATIENT
Start: 2024-06-12

## 2024-06-12 NOTE — ASSESSMENT & PLAN NOTE
Discussed with patient regarding insomnia and tips to help with sleeping will prescribe Ambien only to be taken as needed.

## 2024-06-12 NOTE — ASSESSMENT & PLAN NOTE
Pain is resolved however on and off he still gets some mild discomfort feeling will renew his baclofen to be taken as needed

## 2024-06-12 NOTE — ASSESSMENT & PLAN NOTE
Patient is taking pantoprazole as needed will continue with the same recommend patient avoid spicy food and not to eat late in the night and keep the head end of the bed up

## 2024-06-12 NOTE — ASSESSMENT & PLAN NOTE
Patient BMI is 32.44 emphasized again regarding diet exercise lifestyle modification to lose weight

## 2024-08-23 ENCOUNTER — OFFICE VISIT (OUTPATIENT)
Dept: URGENT CARE | Facility: CLINIC | Age: 59
End: 2024-08-23
Payer: COMMERCIAL

## 2024-08-23 VITALS
RESPIRATION RATE: 22 BRPM | TEMPERATURE: 98 F | HEART RATE: 72 BPM | DIASTOLIC BLOOD PRESSURE: 80 MMHG | OXYGEN SATURATION: 98 % | BODY MASS INDEX: 33.73 KG/M2 | WEIGHT: 209 LBS | SYSTOLIC BLOOD PRESSURE: 124 MMHG

## 2024-08-23 DIAGNOSIS — H00.012 HORDEOLUM EXTERNUM OF RIGHT LOWER EYELID: Primary | ICD-10-CM

## 2024-08-23 PROCEDURE — 99213 OFFICE O/P EST LOW 20 MIN: CPT | Performed by: PHYSICIAN ASSISTANT

## 2024-08-23 RX ORDER — ERYTHROMYCIN 5 MG/G
0.5 OINTMENT OPHTHALMIC
Qty: 3.5 G | Refills: 0 | Status: SHIPPED | OUTPATIENT
Start: 2024-08-23

## 2024-08-23 NOTE — PROGRESS NOTES
"  [facility]        NAME: Fernando Beasley is a 59 y.o. male  : 1965    MRN: [id number]  DATE: 2024  TIME: 11:48 AM    Assessment and Plan   Hordeolum externum of right lower eyelid [H00.012]  1. Hordeolum externum of right lower eyelid  erythromycin (ILOTYCIN) ophthalmic ointment            Patient Instructions   Stye R lower eyelid:   -The patient's hx is consistent with significant stye.  Will prescribe erythromycin ointment  to be used as directed.   -warm compresses on the eyes 2-3 times a day, this is the mainstay of treatment   -Eyelid scrub before bed as discussed   -Saline eye drops to flush discharge   -Avoid contact lenses until your symptoms improve  -Sunglasses can be worn for light sensitivity  -Frequent hand washing   -Follow up with your Eye Doctor immediately if your sx worsen or persist. We discussed that he may need I&D if it does not drain or worsens.         Follow up with PCP in 3-5 days.  Proceed to  ER if symptoms worsen.    If tests have been performed at [facility], our office will contact you with results if changes need to be made to the care plan discussed with you at the visit.  [facility] on [facility]    Chief Complaint     Chief Complaint   Patient presents with    Eye Pain     Pt here for right eye  area  pain  pt has  large   swelling on lower eyelid area is itchy.           History of Present Illness       The patient presents today for a possible stye of the R lower eyelid. The patient states that four days ago he noticed a small red \"bump\" and itching over the R lower eyelid. The area has become more swollen, tender and erythematous. He states that he has been using OTC saline eye rinse.  The patient denies blurred vision. No fever or chills. No URI sx. No sick contacts.  No periorbital pain or swelling. No injury or trauma to the eye. No vision changes. No tearing or foreign body sensation. No photophobia. No contact lens " use.               Review of Systems   Review of Systems   Constitutional:  Negative for activity change, appetite change, chills, diaphoresis, fatigue and fever.   HENT:  Negative for congestion, ear discharge, ear pain, facial swelling, hearing loss, postnasal drip, rhinorrhea, sinus pressure, sinus pain, sore throat, tinnitus, trouble swallowing and voice change.    Eyes:  Positive for pain and itching. Negative for photophobia, discharge, redness and visual disturbance.   Respiratory:  Negative for apnea, cough, chest tightness, shortness of breath, wheezing and stridor.    Cardiovascular:  Negative for chest pain, palpitations and leg swelling.   Gastrointestinal:  Negative for abdominal distention and abdominal pain.   Genitourinary:  Negative for decreased urine volume.   Musculoskeletal:  Negative for arthralgias, joint swelling, myalgias, neck pain and neck stiffness.   Skin:  Negative for rash.   Allergic/Immunologic: Negative for immunocompromised state.   Neurological:  Negative for dizziness, weakness, light-headedness, numbness and headaches.   Hematological:  Negative for adenopathy.         Current Medications       Current Outpatient Medications:     amLODIPine (NORVASC) 5 mg tablet, Take 1 tablet (5 mg total) by mouth daily, Disp: 90 tablet, Rfl: 0    baclofen 10 mg tablet, Take 1 tablet (10 mg total) by mouth 2 (two) times a day, Disp: 30 tablet, Rfl: 0    erythromycin (ILOTYCIN) ophthalmic ointment, Administer 0.5 inches to the right eye daily at bedtime, Disp: 3.5 g, Rfl: 0    Multiple Vitamins-Minerals (MENS 50+ ADVANCED PO), Take by mouth daily after lunch, Disp: , Rfl:     pantoprazole (PROTONIX) 40 mg tablet, Take 1 tablet (40 mg total) by mouth daily as needed (GERD), Disp: , Rfl:     sildenafil (VIAGRA) 100 mg tablet, Take 1 tablet (100 mg total) by mouth as needed for erectile dysfunction, Disp: 12 tablet, Rfl: 2    zolpidem (AMBIEN) 5 mg tablet, Take 1 tablet (5 mg total) by mouth daily  at bedtime as needed for sleep, Disp: 30 tablet, Rfl: 0    Current Allergies     Allergies as of 08/23/2024    (No Known Allergies)            The following portions of the patient's history were reviewed and updated as appropriate: allergies, current medications, past family history, past medical history, past social history, past surgical history and problem list.     Past Medical History:   Diagnosis Date    Cellulitis     infection on abd x 2 mos    Dislocation of metatarsal joint of left foot 05/31/2023    GERD (gastroesophageal reflux disease)     Hypertension     Mass of abdomen     right    Ventral incisional hernia 09/18/2018       Past Surgical History:   Procedure Laterality Date    APPENDECTOMY  2008    CYST REMOVAL      chest    NC ESOPHAGOGASTRODUODENOSCOPY TRANSORAL DIAGNOSTIC N/A 8/28/2018    Procedure: ESOPHAGOGASTRODUODENOSCOPY (EGD);  Surgeon: Yaw Hamilton MD;  Location: [facility] GI LAB;  Service: General    NC EXC TUMOR SOFT TISSUE ABDL WALL SUBFASCIAL <5CM Right 9/18/2018    Procedure: EXPLORATION OF WOUND, EXCISION OF LIPOMA R . HERNIA REPAIR OF RIGHT GROIN WITH MESH;  Surgeon: Yaw Hamilton MD;  Location: [facility] OR;  Service: General       Family History   Problem Relation Age of Onset    Stroke Mother          Medications have been verified.        Objective   /80   Pulse 72   Temp 98 °F (36.7 °C)   Resp 22   Wt 94.8 kg (209 lb)   SpO2 98%   BMI 33.73 kg/m²   No LMP for male patient.       Physical Exam     Physical Exam  Vitals and nursing note reviewed.   Constitutional:       General: He is not in acute distress.     Appearance: Normal appearance. He is not ill-appearing, toxic-appearing or diaphoretic.   HENT:      Head: Normocephalic and atraumatic.   Eyes:      General: Vision grossly intact. Gaze aligned appropriately. No allergic shiner, visual field deficit or scleral icterus.        Right eye: Hordeolum (large external stye with multiple pustules on the R lower eyelid.  Mild surrounding erythema and edema. No drainage. Mild tenderness. No rema orbital edema or erythema.) present. No foreign body or discharge.         Left eye: No foreign body, discharge or hordeolum.      Extraocular Movements: Extraocular movements intact.      Right eye: Normal extraocular motion and no nystagmus.      Conjunctiva/sclera: Conjunctivae normal.      Right eye: Right conjunctiva is not injected. No chemosis, exudate or hemorrhage.     Pupils: Pupils are equal, round, and reactive to light. Pupils are equal.   Neurological:      Mental Status: He is alert.

## 2024-08-23 NOTE — PATIENT INSTRUCTIONS
"Patient Education     Stye   The Basics   Written by the doctors and editors at [facility]   What is a stye? -- A stye is a red and painful lump on the eyelid. It happens when a small gland on the edge of the eyelid gets infected or inflamed. Styes can form on the upper or lower eyelids. Most styes get better on their own after a few days to a week. The medical term for stye is \"hordeolum.\"  People sometimes get a stye confused with a different eye problem called a \"chalazion.\" A chalazion also causes a lump on the eyelid. But a stye is caused by an infection and is painful. A chalazion is not tender or painful, but it often lasts longer than a stye does.  What are the symptoms of a stye? -- People who have a stye have a painful lump on the edge of their eyelid (picture 1). The lump might look red, swollen, or similar to a pimple.  A stye usually develops over a few days. Styes can cause other symptoms, too, such as tearing and eyelid pain and swelling.  Is there a test for a stye? -- No. But your doctor or nurse should be able to tell if you have a stye by talking with you and doing an exam.  Is there anything I can do on my own? -- Yes:   Put a warm, wet compress on the stye. Wet a clean washcloth with warm water, and put it over your stye. When the washcloth cools, reheat it with warm water and put it back over the stye. Repeat these steps for about 15 minutes, and try to do this 4 times a day.   It might help to gently massage your eyelid.   Do not squeeze or try to pop your stye. This can make it worse.   Do not wear eye makeup or contact lenses until your stye is gone.  What treatments might my doctor use? -- If your stye doesn't get better or if it leads to other problems, your doctor might:   Prescribe a cream or ointment that goes in the eye and on the eyelid   Prescribe antibiotic medicines   Do a procedure to drain the stye  Can styes be prevented? -- Yes. To lower your chances of getting a stye, you can:   " Wash your hands often - It's especially important to wash your hands before you touch your eyes. Also, if you wear contact lenses, keep them clean and wash your hands before you put them in.   Be careful with your eye makeup - Wearing eye makeup can sometimes cause a stye. Remove your eye makeup each night, and throw away old makeup. Do not share eye makeup with other people.  When should I call the doctor? -- Call your doctor or nurse for advice if:   Your stye doesn't go away after using warm compresses for 1 to 2 weeks.   Your stye gets very big, bleeds, or affects your vision.   Your whole eye is red, or your whole eyelid is red or swollen.   The redness or swelling spreads to your cheek or other parts of your face.  All topics are updated as new evidence becomes available and our peer review process is complete.  This topic retrieved from [employer] on: Feb 26, 2024.  Topic 80056 Version 17.0  Release: 32.2.4 - C32.56  © 2024 UpToDate, Inc. and/or its affiliates. All rights reserved.  picture 1: Stye     This person has a stye on the lower eyelid.  Graphic 54554 Version 2.0  Consumer Information Use and Disclaimer   Disclaimer: This generalized information is a limited summary of diagnosis, treatment, and/or medication information. It is not meant to be comprehensive and should be used as a tool to help the user understand and/or assess potential diagnostic and treatment options. It does NOT include all information about conditions, treatments, medications, side effects, or risks that may apply to a specific patient. It is not intended to be medical advice or a substitute for the medical advice, diagnosis, or treatment of a health care provider based on the health care provider's examination and assessment of a patient's specific and unique circumstances. Patients must speak with a health care provider for complete information about their health, medical questions, and treatment options, including any risks or  benefits regarding use of medications. This information does not endorse any treatments or medications as safe, effective, or approved for treating a specific patient. UpToDate, Inc. and its affiliates disclaim any warranty or liability relating to this information or the use thereof.The use of this information is governed by the Terms of Use, available at https://www.[employer].com/en/know/clinical-effectiveness-terms. 2024© UpToDate, Inc. and its affiliates and/or licensors. All rights reserved.  Copyright   © 2024 UpToDate, Inc. and/or its affiliates. All rights reserved.    Stye R lower eyelid:   -The patient's hx is consistent with significant stye.  Will prescribe erythromycin ointment  to be used as directed.   -warm compresses on the eyes 2-3 times a day, this is the mainstay of treatment   -Eyelid scrub before bed as discussed   -Saline eye drops to flush discharge   -Avoid contact lenses until your symptoms improve  -Sunglasses can be worn for light sensitivity  -Frequent hand washing   -Follow up with your Eye Doctor immediately if your sx worsen or persist. We discussed that he may need I&D if it does not drain or worsens.

## 2024-09-09 ENCOUNTER — RA CDI HCC (OUTPATIENT)
Dept: OTHER | Facility: HOSPITAL | Age: 59
End: 2024-09-09

## 2024-09-09 NOTE — PROGRESS NOTES
HCC coding opportunities       Chart reviewed, no opportunity found: CHART REVIEWED, NO OPPORTUNITY FOUND        Patients Insurance        Commercial Insurance: [employer]

## 2024-09-16 ENCOUNTER — OFFICE VISIT (OUTPATIENT)
Dept: FAMILY MEDICINE CLINIC | Facility: CLINIC | Age: 59
End: 2024-09-16
Payer: COMMERCIAL

## 2024-09-16 VITALS
HEIGHT: 66 IN | HEART RATE: 82 BPM | WEIGHT: 209 LBS | DIASTOLIC BLOOD PRESSURE: 72 MMHG | BODY MASS INDEX: 33.59 KG/M2 | OXYGEN SATURATION: 96 % | SYSTOLIC BLOOD PRESSURE: 118 MMHG

## 2024-09-16 DIAGNOSIS — K21.9 GASTROESOPHAGEAL REFLUX DISEASE WITHOUT ESOPHAGITIS: ICD-10-CM

## 2024-09-16 DIAGNOSIS — N52.9 ERECTILE DYSFUNCTION, UNSPECIFIED ERECTILE DYSFUNCTION TYPE: ICD-10-CM

## 2024-09-16 DIAGNOSIS — Z13.29 SCREENING FOR THYROID DISORDER: ICD-10-CM

## 2024-09-16 DIAGNOSIS — M54.50 ACUTE RIGHT-SIDED LOW BACK PAIN WITHOUT SCIATICA: ICD-10-CM

## 2024-09-16 DIAGNOSIS — E78.5 DYSLIPIDEMIA: ICD-10-CM

## 2024-09-16 DIAGNOSIS — E66.09 CLASS 1 OBESITY DUE TO EXCESS CALORIES WITHOUT SERIOUS COMORBIDITY WITH BODY MASS INDEX (BMI) OF 33.0 TO 33.9 IN ADULT: ICD-10-CM

## 2024-09-16 DIAGNOSIS — K31.9 LESION OF STOMACH: ICD-10-CM

## 2024-09-16 DIAGNOSIS — I10 PRIMARY HYPERTENSION: Primary | ICD-10-CM

## 2024-09-16 DIAGNOSIS — F51.01 PRIMARY INSOMNIA: ICD-10-CM

## 2024-09-16 DIAGNOSIS — R79.89 LOW TSH LEVEL: ICD-10-CM

## 2024-09-16 DIAGNOSIS — Z13.0 SCREENING FOR DEFICIENCY ANEMIA: ICD-10-CM

## 2024-09-16 DIAGNOSIS — R73.03 PRE-DIABETES: ICD-10-CM

## 2024-09-16 PROCEDURE — 99214 OFFICE O/P EST MOD 30 MIN: CPT | Performed by: INTERNAL MEDICINE

## 2024-09-16 RX ORDER — SILDENAFIL 100 MG/1
100 TABLET, FILM COATED ORAL AS NEEDED
Qty: 12 TABLET | Refills: 2 | Status: SHIPPED | OUTPATIENT
Start: 2024-09-16

## 2024-09-16 RX ORDER — AMLODIPINE BESYLATE 5 MG/1
5 TABLET ORAL DAILY
Qty: 90 TABLET | Refills: 0 | Status: SHIPPED | OUTPATIENT
Start: 2024-09-16

## 2024-09-16 RX ORDER — BACLOFEN 10 MG/1
10 TABLET ORAL 2 TIMES DAILY
Qty: 30 TABLET | Refills: 0 | Status: SHIPPED | OUTPATIENT
Start: 2024-09-16

## 2024-09-16 RX ORDER — PANTOPRAZOLE SODIUM 40 MG/1
40 TABLET, DELAYED RELEASE ORAL DAILY PRN
Qty: 90 TABLET | Refills: 1 | Status: SHIPPED | OUTPATIENT
Start: 2024-09-16

## 2024-09-16 NOTE — PROGRESS NOTES
Office Visit Note  24     Fernando Beasley 59 y.o. male MRN: [id number]  : 1965    Assessment:     1. Primary hypertension  Assessment & Plan:  Blood pressure is stable today 118/72 on amlodipine 5 mg daily continue  Orders:  -     amLODIPine (NORVASC) 5 mg tablet; Take 1 tablet (5 mg total) by mouth daily  2. Class 1 obesity due to excess calories without serious comorbidity with body mass index (BMI) of 33.0 to 33.9 in adult  Assessment & Plan:  Patient BMI has gone up to 33.73 admits to being noncompliant with the diet emphasized regarding diet exercise lifestyle modification to lose weight  3. Gastroesophageal reflux disease without esophagitis  Assessment & Plan:  Patient takes pantoprazole as needed    4. Primary insomnia  Assessment & Plan:  Patient occasionally takes Ambien as needed we will reorder the same but discussed with him about being dependent on that  5. Erectile dysfunction, unspecified erectile dysfunction type  Assessment & Plan:  Continue sildenafil   Orders:  -     sildenafil (VIAGRA) 100 mg tablet; Take 1 tablet (100 mg total) by mouth as needed for erectile dysfunction  6. Lesion of stomach  -     pantoprazole (PROTONIX) 40 mg tablet; Take 1 tablet (40 mg total) by mouth daily as needed (GERD)  7. Pre-diabetes  8. Dyslipidemia  9. Screening for thyroid disorder  -     TSH, 3rd generation with Free T4 reflex; Future  -     TSH, 3rd generation with Free T4 reflex  10. Screening for deficiency anemia  11. Acute right-sided low back pain without sciatica  Assessment & Plan:  Patient is still getting on and off low back pain for which she takes baclofen and responds very well we will give him few more tablets of the same if the pain keeps recurring he needs further workup  Orders:  -     baclofen 10 mg tablet; Take 1 tablet (10 mg total) by mouth 2 (two) times a day  12. Low TSH level  Assessment & Plan:  Patient's TSH level was running on the low side must go for the lab work              Discussion Summary and Plan:  Today's care plan and medications were reviewed with patient in detail and all their questions answered to their satisfaction.    Chief Complaint   Patient presents with    Follow-up      Subjective:  Patient is coming here for a follow-up evaluation with regards to symptoms of primary hypertension also with history of GERD on pantoprazole on and off he gets low back pain for which she takes baclofen as needed.  Patient also has difficulty sometimes sleeping in the night occasionally takes zolpidem.  Medication reviewed labs reviewed history of snoring was running on the lower side recommend patient to get the thyroid function test done but not done yet again emphasized the need for the same.        The following portions of the patient's history were reviewed and updated as appropriate: allergies, current medications, past family history, past medical history, past social history, past surgical history and problem list.    Review of Systems   Constitutional:  Negative for chills and fever.   HENT:  Negative for ear pain and sore throat.    Eyes:  Negative for pain and visual disturbance.   Respiratory:  Negative for cough and shortness of breath.    Cardiovascular:  Negative for chest pain and palpitations.   Gastrointestinal:  Negative for abdominal pain and vomiting.   Genitourinary:  Negative for dysuria and hematuria.   Musculoskeletal:  Negative for arthralgias and back pain.   Skin:  Negative for color change and rash.   Neurological:  Negative for seizures and syncope.   All other systems reviewed and are negative.        Historical Information   Patient Active Problem List   Diagnosis    Primary hypertension    Gastroesophageal reflux disease without esophagitis    Erectile dysfunction    Class 1 obesity due to excess calories in adult    Primary insomnia    Acute right-sided low back pain without sciatica    Low TSH level    Hordeolum externum of right lower eyelid      Past Medical History:   Diagnosis Date    Cellulitis     infection on abd x 2 mos    Dislocation of metatarsal joint of left foot 05/31/2023    GERD (gastroesophageal reflux disease)     Hypertension     Mass of abdomen     right    Ventral incisional hernia 09/18/2018     Past Surgical History:   Procedure Laterality Date    APPENDECTOMY  2008    CYST REMOVAL      chest    MS ESOPHAGOGASTRODUODENOSCOPY TRANSORAL DIAGNOSTIC N/A 8/28/2018    Procedure: ESOPHAGOGASTRODUODENOSCOPY (EGD);  Surgeon: Yaw Hamilton MD;  Location: [facility] GI LAB;  Service: General    MS EXC TUMOR SOFT TISSUE ABDL WALL SUBFASCIAL <5CM Right 9/18/2018    Procedure: EXPLORATION OF WOUND, EXCISION OF LIPOMA R . HERNIA REPAIR OF RIGHT GROIN WITH MESH;  Surgeon: Yaw Hamilton MD;  Location: [facility] OR;  Service: General     Social History     Substance and Sexual Activity   Alcohol Use No     Social History     Substance and Sexual Activity   Drug Use No     Social History     Tobacco Use   Smoking Status Some Days    Current packs/day: 0.50    Average packs/day: 0.5 packs/day for 25.0 years (12.5 ttl pk-yrs)    Types: Cigarettes   Smokeless Tobacco Never     Family History   Problem Relation Age of Onset    Stroke Mother      Health Maintenance Due   Topic    Hepatitis C Screening     Pneumococcal Vaccine: Pediatrics (0 to 5 Years) and At-Risk Patients (6 to 64 Years) (1 of 2 - PCV)    HIV Screening     Annual Physical     DTaP,Tdap,and Td Vaccines (1 - Tdap)    Zoster Vaccine (1 of 2)    RSV Vaccine Age 60+ Years (1 - 1-dose 60+ series)    COVID-19 Vaccine (3 - 2023-24 season)    Influenza Vaccine (1)      Meds/Allergies       Current Outpatient Medications:     amLODIPine (NORVASC) 5 mg tablet, Take 1 tablet (5 mg total) by mouth daily, Disp: 90 tablet, Rfl: 0    baclofen 10 mg tablet, Take 1 tablet (10 mg total) by mouth 2 (two) times a day, Disp: 30 tablet, Rfl: 0    Multiple Vitamins-Minerals (MENS 50+ ADVANCED PO), Take by mouth daily  "after lunch, Disp: , Rfl:     pantoprazole (PROTONIX) 40 mg tablet, Take 1 tablet (40 mg total) by mouth daily as needed (GERD), Disp: 90 tablet, Rfl: 1    sildenafil (VIAGRA) 100 mg tablet, Take 1 tablet (100 mg total) by mouth as needed for erectile dysfunction, Disp: 12 tablet, Rfl: 2    zolpidem (AMBIEN) 5 mg tablet, Take 1 tablet (5 mg total) by mouth daily at bedtime as needed for sleep, Disp: 30 tablet, Rfl: 0      Objective:    Vitals:   /72 (BP Location: Right arm, Patient Position: Sitting, Cuff Size: Standard)   Pulse 82   Ht 5' 6\" (1.676 m)   Wt 94.8 kg (209 lb)   SpO2 96%   BMI 33.73 kg/m²   Body mass index is 33.73 kg/m².  Vitals:    09/16/24 1624   Weight: 94.8 kg (209 lb)       Physical Exam  Vitals and nursing note reviewed.   Constitutional:       Appearance: He is obese.   Cardiovascular:      Rate and Rhythm: Normal rate and regular rhythm.      Heart sounds: Normal heart sounds.   Pulmonary:      Effort: Pulmonary effort is normal.      Breath sounds: Normal breath sounds.   Abdominal:      General: Abdomen is flat.      Palpations: Abdomen is soft.   Musculoskeletal:      Right lower leg: No edema.      Left lower leg: No edema.   Skin:     General: Skin is warm and dry.      Capillary Refill: Capillary refill takes less than 2 seconds.   Neurological:      General: No focal deficit present.      Mental Status: He is alert and oriented to person, place, and time.   Psychiatric:         Mood and Affect: Mood normal.         Behavior: Behavior normal.         Lab Review   No visits with results within 2 Month(s) from this visit.   Latest known visit with results is:   Orders Only on 04/23/2024   Component Date Value Ref Range Status    Specific Gravity 04/23/2024 1.027  1.005 - 1.030 Final    Ph 04/23/2024 5.5  5.0 - 7.5 Final    Color UA 04/23/2024 Yellow  Yellow Final    Urine Appearance 04/23/2024 Clear  Clear Final    Leukocyte Esterase 04/23/2024 Negative  Negative Final    " "Protein 04/23/2024 Trace  Negative/Trace Final    Glucose, 24 HR Urine 04/23/2024 Negative  Negative Final    Ketone, Urine 04/23/2024 Negative  Negative Final    Blood, Urine 04/23/2024 Negative  Negative Final    Bilirubin, Urine 04/23/2024 Negative  Negative Final    Urobilinogen Urine 04/23/2024 0.2  0.2 - 1.0 mg/dL Final    Nitrites Urine 04/23/2024 Negative  Negative Final    Microscopic Examination 04/23/2024 Comment   Final    Microscopic follows if indicated.    Microscopic Examination 04/23/2024 See below:   Final    Microscopic was indicated and was performed.    Urinalysis Reflex 04/23/2024 Comment   Final    This specimen will not reflex to a Urine Culture.    SL AMB WBC, URINE 04/23/2024 None seen  0 - 5 /hpf Final    RBC, Urine 04/23/2024 None seen  0 - 2 /hpf Final    Epithelial Cells (non renal) 04/23/2024 0-10  0 - 10 /hpf Final    Casts 04/23/2024 None seen  None seen /lpf Final    Bacteria, Urine 04/23/2024 None seen  None seen/Few Final         Jessica Fatima MD        \"This note has been constructed using a voice recognition system.Therefore there may be syntax, spelling, and/or grammatical errors. Please call if you have any questions. \"  "

## 2024-09-16 NOTE — ASSESSMENT & PLAN NOTE
Patient is still getting on and off low back pain for which she takes baclofen and responds very well we will give him few more tablets of the same if the pain keeps recurring he needs further workup

## 2024-09-16 NOTE — ASSESSMENT & PLAN NOTE
Patient BMI has gone up to 33.73 admits to being noncompliant with the diet emphasized regarding diet exercise lifestyle modification to lose weight

## 2024-09-22 LAB — TSH SERPL DL<=0.005 MIU/L-ACNC: 0.4 UIU/ML (ref 0.45–4.5)

## 2024-09-23 DIAGNOSIS — E04.1 THYROID NODULE: ICD-10-CM

## 2024-09-23 DIAGNOSIS — E06.9 THYROIDITIS: Primary | ICD-10-CM

## 2024-09-24 ENCOUNTER — TELEPHONE (OUTPATIENT)
Dept: FAMILY MEDICINE CLINIC | Facility: CLINIC | Age: 59
End: 2024-09-24

## 2024-09-24 NOTE — TELEPHONE ENCOUNTER
----- Message from Jessica Fatima MD sent at 9/23/2024  9:58 AM EDT -----  Inform patient blood test for the thyroid is abnormal and I have ordered additional blood testing and also ultrasound of the thyroid.  I would like to see him after these tests are done  ----- Message -----  From: [facility] Lab Results In  Sent: 9/22/2024   8:07 AM EDT  To: Jessica Fatima MD          Called pt he was informed he will come in tomorrow to  the requisitions    None

## 2024-09-28 ENCOUNTER — HOSPITAL ENCOUNTER (OUTPATIENT)
Dept: RADIOLOGY | Facility: HOSPITAL | Age: 59
Discharge: HOME | End: 2024-09-28
Attending: INTERNAL MEDICINE
Payer: COMMERCIAL

## 2024-09-28 DIAGNOSIS — E04.1 THYROID NODULE: ICD-10-CM

## 2024-09-28 PROCEDURE — 76536 US EXAM OF HEAD AND NECK: CPT

## 2024-10-01 LAB
THYROGLOB AB SERPL-ACNC: <1 IU/ML (ref 0–0.9)
THYROPEROXIDASE AB SERPL-ACNC: <9 IU/ML (ref 0–34)

## 2025-01-13 ENCOUNTER — RA CDI HCC (OUTPATIENT)
Dept: OTHER | Facility: HOSPITAL | Age: 60
End: 2025-01-13

## 2025-01-13 NOTE — PROGRESS NOTES
HCC coding opportunities       Chart reviewed, no opportunity found: CHART REVIEWED, NO OPPORTUNITY FOUND        Patients Insurance        Commercial Insurance: [employer]

## 2025-01-19 ENCOUNTER — TELEPHONE (OUTPATIENT)
Dept: OTHER | Facility: OTHER | Age: 60
End: 2025-01-19

## 2025-01-19 NOTE — TELEPHONE ENCOUNTER
Patient called in today to reschedule appointment from Monday, 1/20 at 0740 with Dr. Fatima due to weather. Rescheduled for 1/20 at 4:20pm.

## 2025-01-20 NOTE — PROGRESS NOTES
Office Visit Note  25     Fernando Beasley 59 y.o. male MRN: [id number]  : 1965    Assessment:     1. Primary hypertension  Assessment & Plan:  Blood pressure 115/68 on amlodipine 5 mg daily continue  2. Primary insomnia  Assessment & Plan:  Patient takes Ambien occasionally to help him sleep.  Orders:  -     zolpidem (AMBIEN) 5 mg tablet; Take 1 tablet (5 mg total) by mouth daily at bedtime as needed for sleep  3. Low TSH level  Assessment & Plan:  Patient has mildly low TSH levels thyroid ultrasound came back normal no nodules however the lab did not do the free T4 we will order the same along with a total T3 and if necessary radioactive iodine uptake after getting the results.  4. Acute right-sided low back pain without sciatica  Assessment & Plan:  Patient on and off still gets some low back pain for which she takes baclofen with good response we will continue the same for now.  Examination of the lumbar spine unremarkable  Orders:  -     baclofen 10 mg tablet; Take 1 tablet (10 mg total) by mouth 2 (two) times a day  5. Erectile dysfunction, unspecified erectile dysfunction type  Assessment & Plan:  Patient is on sildenafil continue the same  Orders:  -     sildenafil (VIAGRA) 100 mg tablet; Take 1 tablet (100 mg total) by mouth as needed for erectile dysfunction  6. Class 1 obesity due to excess calories without serious comorbidity with body mass index (BMI) of 33.0 to 33.9 in adult  Assessment & Plan:      Patient's BMI is 33.80 slightly more than what it was before patient has been gradually gaining weight but he admits to being noncompliant with the diet eats a lot of food according to him ever since he has stopped smoking again emphasized regarding diet exercise lifestyle modification and lose weight.  7. Gastroesophageal reflux disease without esophagitis  Assessment & Plan:  Currently patient takes pantoprazole only as needed  8. Screening PSA (prostate specific antigen)  -     PSA Total  (Reflex To Free); Future; Expected date: 07/05/2025  -     PSA Total (Reflex To Free)  9. Screening for thyroid disorder  -     T4, free; Future  -     T3; Future  -     TSH, 3rd generation with Free T4 reflex; Future; Expected date: 01/21/2025  -     T4, free  -     T3  -     TSH, 3rd generation with Free T4 reflex  10. Screening for deficiency anemia  -     CBC and differential; Future  -     CBC and differential  11. Dyslipidemia  -     Lipid panel; Future  -     Lipid panel  12. Pre-diabetes  -     Urinalysis with microscopic; Future  -     Comprehensive metabolic panel; Future  -     Hemoglobin A1C; Future; Expected date: 01/21/2025  -     Urinalysis with microscopic  -     Comprehensive metabolic panel  -     Hemoglobin A1C        Tobacco Cessation Counseling: Tobacco cessation counseling was provided. The patient is sincerely urged to quit consumption of tobacco. He is ready to quit tobacco. Patient has stopped smoking 5 years ago need to have this thing corrected in the records.          Discussion Summary and Plan:  Today's care plan and medications were reviewed with patient in detail and all their questions answered to their satisfaction.    Chief Complaint   Patient presents with    Follow-up      Subjective:  Patient is coming for follow-up evaluation with a history of hypertension, GERD, low TSH levels erectile dysfunction and obesity.  He denies any symptoms of chest pain palpitation shortness of breath.  Concern is the weight gain he has been having.  Medications reviewed labs reviewed.        The following portions of the patient's history were reviewed and updated as appropriate: allergies, current medications, past family history, past medical history, past social history, past surgical history and problem list.    Review of Systems   Constitutional:  Negative for chills and fever.   HENT:  Negative for ear pain and sore throat.    Eyes:  Negative for pain and visual disturbance.   Respiratory:   Negative for cough and shortness of breath.    Cardiovascular:  Negative for chest pain and palpitations.   Gastrointestinal:  Negative for abdominal pain and vomiting.   Genitourinary:  Negative for dysuria and hematuria.   Musculoskeletal:  Negative for arthralgias and back pain.   Skin:  Negative for color change and rash.   Neurological:  Negative for seizures and syncope.   All other systems reviewed and are negative.        Historical Information   Patient Active Problem List   Diagnosis    Primary hypertension    Gastroesophageal reflux disease without esophagitis    Erectile dysfunction    Class 1 obesity due to excess calories in adult    Primary insomnia    Acute right-sided low back pain without sciatica    Low TSH level    Hordeolum externum of right lower eyelid     Past Medical History:   Diagnosis Date    Cellulitis     infection on abd x 2 mos    Dislocation of metatarsal joint of left foot 05/31/2023    GERD (gastroesophageal reflux disease)     Hypertension     Mass of abdomen     right    Ventral incisional hernia 09/18/2018     Past Surgical History:   Procedure Laterality Date    APPENDECTOMY  2008    CYST REMOVAL      chest    WI ESOPHAGOGASTRODUODENOSCOPY TRANSORAL DIAGNOSTIC N/A 8/28/2018    Procedure: ESOPHAGOGASTRODUODENOSCOPY (EGD);  Surgeon: Yaw Hamilton MD;  Location: [facility] GI LAB;  Service: General    WI EXC TUMOR SOFT TISSUE ABDL WALL SUBFASCIAL <5CM Right 9/18/2018    Procedure: EXPLORATION OF WOUND, EXCISION OF LIPOMA R . HERNIA REPAIR OF RIGHT GROIN WITH MESH;  Surgeon: Yaw Hamilton MD;  Location: [facility] OR;  Service: General     Social History     Substance and Sexual Activity   Alcohol Use No     Social History     Substance and Sexual Activity   Drug Use No     Social History     Tobacco Use   Smoking Status Some Days    Current packs/day: 0.50    Average packs/day: 0.5 packs/day for 25.0 years (12.5 ttl pk-yrs)    Types: Cigarettes   Smokeless Tobacco Never     Family  "History   Problem Relation Age of Onset    Stroke Mother      Health Maintenance Due   Topic    Hepatitis C Screening     Pneumococcal Vaccine: Pediatrics (0 to 5 Years) and At-Risk Patients (6 to 64 Years) (1 of 2 - PCV)    HIV Screening     Annual Physical     DTaP,Tdap,and Td Vaccines (1 - Tdap)    Zoster Vaccine (1 of 2)    Influenza Vaccine (1)    COVID-19 Vaccine (3 - 2024-25 season)    Depression Screening       Meds/Allergies       Current Outpatient Medications:     amLODIPine (NORVASC) 5 mg tablet, Take 1 tablet (5 mg total) by mouth daily, Disp: 90 tablet, Rfl: 0    baclofen 10 mg tablet, Take 1 tablet (10 mg total) by mouth 2 (two) times a day, Disp: 30 tablet, Rfl: 0    Multiple Vitamins-Minerals (MENS 50+ ADVANCED PO), Take by mouth daily after lunch, Disp: , Rfl:     pantoprazole (PROTONIX) 40 mg tablet, Take 1 tablet (40 mg total) by mouth daily as needed (GERD), Disp: 90 tablet, Rfl: 1    sildenafil (VIAGRA) 100 mg tablet, Take 1 tablet (100 mg total) by mouth as needed for erectile dysfunction, Disp: 12 tablet, Rfl: 2    zolpidem (AMBIEN) 5 mg tablet, Take 1 tablet (5 mg total) by mouth daily at bedtime as needed for sleep, Disp: 30 tablet, Rfl: 0      Objective:    Vitals:   /68 (BP Location: Right arm, Patient Position: Sitting, Cuff Size: Standard)   Pulse 82   Ht 5' 6\" (1.676 m)   Wt 95 kg (209 lb 6.4 oz)   SpO2 96%   BMI 33.80 kg/m²   Body mass index is 33.8 kg/m².  Vitals:    01/21/25 1629   Weight: 95 kg (209 lb 6.4 oz)       Physical Exam  Vitals and nursing note reviewed.   Constitutional:       Appearance: Normal appearance.   Cardiovascular:      Rate and Rhythm: Normal rate and regular rhythm.      Heart sounds: Normal heart sounds.   Pulmonary:      Effort: Pulmonary effort is normal.      Breath sounds: Normal breath sounds.   Abdominal:      General: Abdomen is flat.      Palpations: Abdomen is soft.   Musculoskeletal:      Right lower leg: No edema.      Left lower leg: " "No edema.   Skin:     General: Skin is warm and dry.   Neurological:      General: No focal deficit present.      Mental Status: He is alert and oriented to person, place, and time.   Psychiatric:         Mood and Affect: Mood normal.         Behavior: Behavior normal.         Lab Review   No visits with results within 2 Month(s) from this visit.   Latest known visit with results is:   Orders Only on 09/28/2024   Component Date Value Ref Range Status    THYROID MICROSOMAL ANTIBODY 09/28/2024 <9  0 - 34 IU/mL Final    Thyroglobulin Ab 09/28/2024 <1.0  0.0 - 0.9 IU/mL Final    Comment: Thyroglobulin Antibody measured by [employer] [facility]  It should be noted that the presence of thyroglobulin antibodies  may not be pathogenic nor diagnostic, especially at very low  levels. The assay  has found that four percent of  individuals without evidence of thyroid disease or autoimmunity  will have positive TgAb levels up to 4 IU/mL.           Jessica Fatima MD        \"This note has been constructed using a voice recognition system.Therefore there may be syntax, spelling, and/or grammatical errors. Please call if you have any questions. \"  "

## 2025-01-21 ENCOUNTER — OFFICE VISIT (OUTPATIENT)
Dept: FAMILY MEDICINE CLINIC | Facility: CLINIC | Age: 60
End: 2025-01-21
Payer: COMMERCIAL

## 2025-01-21 VITALS
BODY MASS INDEX: 33.65 KG/M2 | WEIGHT: 209.4 LBS | SYSTOLIC BLOOD PRESSURE: 115 MMHG | DIASTOLIC BLOOD PRESSURE: 68 MMHG | OXYGEN SATURATION: 96 % | HEIGHT: 66 IN | HEART RATE: 82 BPM

## 2025-01-21 DIAGNOSIS — Z13.29 SCREENING FOR THYROID DISORDER: ICD-10-CM

## 2025-01-21 DIAGNOSIS — R73.03 PRE-DIABETES: ICD-10-CM

## 2025-01-21 DIAGNOSIS — I10 PRIMARY HYPERTENSION: Primary | ICD-10-CM

## 2025-01-21 DIAGNOSIS — Z12.5 SCREENING PSA (PROSTATE SPECIFIC ANTIGEN): ICD-10-CM

## 2025-01-21 DIAGNOSIS — N52.9 ERECTILE DYSFUNCTION, UNSPECIFIED ERECTILE DYSFUNCTION TYPE: ICD-10-CM

## 2025-01-21 DIAGNOSIS — K21.9 GASTROESOPHAGEAL REFLUX DISEASE WITHOUT ESOPHAGITIS: ICD-10-CM

## 2025-01-21 DIAGNOSIS — M54.50 ACUTE RIGHT-SIDED LOW BACK PAIN WITHOUT SCIATICA: ICD-10-CM

## 2025-01-21 DIAGNOSIS — F51.01 PRIMARY INSOMNIA: ICD-10-CM

## 2025-01-21 DIAGNOSIS — E78.5 DYSLIPIDEMIA: ICD-10-CM

## 2025-01-21 DIAGNOSIS — R79.89 LOW TSH LEVEL: ICD-10-CM

## 2025-01-21 DIAGNOSIS — E66.811 CLASS 1 OBESITY DUE TO EXCESS CALORIES WITHOUT SERIOUS COMORBIDITY WITH BODY MASS INDEX (BMI) OF 33.0 TO 33.9 IN ADULT: ICD-10-CM

## 2025-01-21 DIAGNOSIS — Z13.0 SCREENING FOR DEFICIENCY ANEMIA: ICD-10-CM

## 2025-01-21 DIAGNOSIS — E66.09 CLASS 1 OBESITY DUE TO EXCESS CALORIES WITHOUT SERIOUS COMORBIDITY WITH BODY MASS INDEX (BMI) OF 33.0 TO 33.9 IN ADULT: ICD-10-CM

## 2025-01-21 PROCEDURE — 99214 OFFICE O/P EST MOD 30 MIN: CPT | Performed by: INTERNAL MEDICINE

## 2025-01-21 RX ORDER — SILDENAFIL 100 MG/1
100 TABLET, FILM COATED ORAL AS NEEDED
Qty: 12 TABLET | Refills: 2 | Status: SHIPPED | OUTPATIENT
Start: 2025-01-21

## 2025-01-21 RX ORDER — BACLOFEN 10 MG/1
10 TABLET ORAL 2 TIMES DAILY
Qty: 30 TABLET | Refills: 0 | Status: SHIPPED | OUTPATIENT
Start: 2025-01-21

## 2025-01-21 RX ORDER — ZOLPIDEM TARTRATE 5 MG/1
5 TABLET ORAL
Qty: 30 TABLET | Refills: 0 | Status: SHIPPED | OUTPATIENT
Start: 2025-01-21

## 2025-01-21 NOTE — ASSESSMENT & PLAN NOTE
Patient on and off still gets some low back pain for which she takes baclofen with good response we will continue the same for now.  Examination of the lumbar spine unremarkable

## 2025-01-21 NOTE — ASSESSMENT & PLAN NOTE
Patient's BMI is 33.80 slightly more than what it was before patient has been gradually gaining weight but he admits to being noncompliant with the diet eats a lot of food according to him ever since he has stopped smoking again emphasized regarding diet exercise lifestyle modification and lose weight.

## 2025-01-21 NOTE — ASSESSMENT & PLAN NOTE
Patient has mildly low TSH levels thyroid ultrasound came back normal no nodules however the lab did not do the free T4 we will order the same along with a total T3 and if necessary radioactive iodine uptake after getting the results.

## 2025-03-02 ENCOUNTER — RESULTS FOLLOW-UP (OUTPATIENT)
Dept: FAMILY MEDICINE CLINIC | Facility: CLINIC | Age: 60
End: 2025-03-02

## 2025-03-02 LAB
ALBUMIN SERPL-MCNC: 4.2 G/DL (ref 3.8–4.9)
ALP SERPL-CCNC: 85 IU/L (ref 44–121)
ALT SERPL-CCNC: 53 IU/L (ref 0–44)
APPEARANCE UR: CLEAR
AST SERPL-CCNC: 36 IU/L (ref 0–40)
BACTERIA URNS QL MICRO: NORMAL
BASOPHILS # BLD AUTO: 0.1 X10E3/UL (ref 0–0.2)
BASOPHILS NFR BLD AUTO: 1 %
BILIRUB SERPL-MCNC: 0.6 MG/DL (ref 0–1.2)
BILIRUB UR QL STRIP: NEGATIVE
BUN SERPL-MCNC: 15 MG/DL (ref 8–27)
BUN/CREAT SERPL: 14 (ref 10–24)
CALCIUM SERPL-MCNC: 9 MG/DL (ref 8.6–10.2)
CASTS URNS QL MICRO: NORMAL /LPF
CHLORIDE SERPL-SCNC: 101 MMOL/L (ref 96–106)
CHOLEST SERPL-MCNC: 163 MG/DL (ref 100–199)
CHOLEST/HDLC SERPL: 2.6 RATIO (ref 0–5)
CO2 SERPL-SCNC: 26 MMOL/L (ref 20–29)
COLOR UR: YELLOW
CREAT SERPL-MCNC: 1.1 MG/DL (ref 0.76–1.27)
EGFR: 77 ML/MIN/1.73
EOSINOPHIL # BLD AUTO: 0.2 X10E3/UL (ref 0–0.4)
EOSINOPHIL NFR BLD AUTO: 3 %
EPI CELLS #/AREA URNS HPF: NORMAL /HPF (ref 0–10)
ERYTHROCYTE [DISTWIDTH] IN BLOOD BY AUTOMATED COUNT: 18.6 % (ref 11.6–15.4)
EST. AVERAGE GLUCOSE BLD GHB EST-MCNC: 140 MG/DL
GLOBULIN SER-MCNC: 2.8 G/DL (ref 1.5–4.5)
GLUCOSE SERPL-MCNC: 108 MG/DL (ref 70–99)
GLUCOSE UR QL: NEGATIVE
HBA1C MFR BLD: 6.5 % (ref 4.8–5.6)
HCT VFR BLD AUTO: 46.9 % (ref 37.5–51)
HDLC SERPL-MCNC: 62 MG/DL
HGB BLD-MCNC: 14.3 G/DL (ref 13–17.7)
HGB UR QL STRIP: NEGATIVE
IMM GRANULOCYTES # BLD: 0 X10E3/UL (ref 0–0.1)
IMM GRANULOCYTES NFR BLD: 0 %
KETONES UR QL STRIP: NEGATIVE
LDLC SERPL CALC-MCNC: 85 MG/DL (ref 0–99)
LEUKOCYTE ESTERASE UR QL STRIP: NEGATIVE
LYMPHOCYTES # BLD AUTO: 2.5 X10E3/UL (ref 0.7–3.1)
LYMPHOCYTES NFR BLD AUTO: 37 %
MCH RBC QN AUTO: 20.4 PG (ref 26.6–33)
MCHC RBC AUTO-ENTMCNC: 30.5 G/DL (ref 31.5–35.7)
MCV RBC AUTO: 67 FL (ref 79–97)
MICRO URNS: NORMAL
MICRO URNS: NORMAL
MONOCYTES # BLD AUTO: 0.6 X10E3/UL (ref 0.1–0.9)
MONOCYTES NFR BLD AUTO: 9 %
NEUTROPHILS # BLD AUTO: 3.5 X10E3/UL (ref 1.4–7)
NEUTROPHILS NFR BLD AUTO: 50 %
NITRITE UR QL STRIP: NEGATIVE
PH UR STRIP: 7 [PH] (ref 5–7.5)
PLATELET # BLD AUTO: 252 X10E3/UL (ref 150–450)
POTASSIUM SERPL-SCNC: 4.5 MMOL/L (ref 3.5–5.2)
PROT SERPL-MCNC: 7 G/DL (ref 6–8.5)
PROT UR QL STRIP: NORMAL
PSA SERPL-MCNC: 3 NG/ML (ref 0–4)
RBC # BLD AUTO: 7 X10E6/UL (ref 4.14–5.8)
RBC #/AREA URNS HPF: NORMAL /HPF (ref 0–2)
SL AMB REFLEX CRITERIA: NORMAL
SL AMB VLDL CHOLESTEROL CALC: 16 MG/DL (ref 5–40)
SODIUM SERPL-SCNC: 140 MMOL/L (ref 134–144)
SP GR UR: 1.02 (ref 1–1.03)
T3 SERPL-MCNC: 131 NG/DL (ref 71–180)
T4 FREE SERPL-MCNC: 1.15 NG/DL (ref 0.82–1.77)
TRIGL SERPL-MCNC: 86 MG/DL (ref 0–149)
TSH SERPL DL<=0.005 MIU/L-ACNC: 0.48 UIU/ML (ref 0.45–4.5)
UROBILINOGEN UR STRIP-ACNC: 1 MG/DL (ref 0.2–1)
WBC # BLD AUTO: 6.9 X10E3/UL (ref 3.4–10.8)
WBC #/AREA URNS HPF: NORMAL /HPF (ref 0–5)

## 2025-03-03 ENCOUNTER — TELEPHONE (OUTPATIENT)
Dept: GASTROENTEROLOGY | Facility: CLINIC | Age: 60
End: 2025-03-03

## 2025-03-03 ENCOUNTER — OFFICE VISIT (OUTPATIENT)
Dept: GASTROENTEROLOGY | Facility: CLINIC | Age: 60
End: 2025-03-03
Payer: COMMERCIAL

## 2025-03-03 VITALS
DIASTOLIC BLOOD PRESSURE: 100 MMHG | BODY MASS INDEX: 34.84 KG/M2 | HEIGHT: 66 IN | SYSTOLIC BLOOD PRESSURE: 161 MMHG | WEIGHT: 216.8 LBS | HEART RATE: 88 BPM

## 2025-03-03 DIAGNOSIS — E66.09 CLASS 1 OBESITY DUE TO EXCESS CALORIES WITHOUT SERIOUS COMORBIDITY WITH BODY MASS INDEX (BMI) OF 33.0 TO 33.9 IN ADULT: ICD-10-CM

## 2025-03-03 DIAGNOSIS — Z86.0100 HX OF COLONIC POLYPS: ICD-10-CM

## 2025-03-03 DIAGNOSIS — I10 PRIMARY HYPERTENSION: ICD-10-CM

## 2025-03-03 DIAGNOSIS — K21.9 GASTROESOPHAGEAL REFLUX DISEASE WITHOUT ESOPHAGITIS: Primary | ICD-10-CM

## 2025-03-03 DIAGNOSIS — E66.811 CLASS 1 OBESITY DUE TO EXCESS CALORIES WITHOUT SERIOUS COMORBIDITY WITH BODY MASS INDEX (BMI) OF 33.0 TO 33.9 IN ADULT: ICD-10-CM

## 2025-03-03 PROCEDURE — 99214 OFFICE O/P EST MOD 30 MIN: CPT | Performed by: NURSE PRACTITIONER

## 2025-03-03 RX ORDER — PANTOPRAZOLE SODIUM 40 MG/1
40 TABLET, DELAYED RELEASE ORAL DAILY
Qty: 90 TABLET | Refills: 3 | Status: SHIPPED | OUTPATIENT
Start: 2025-03-03 | End: 2025-06-01

## 2025-03-03 RX ORDER — SODIUM CHLORIDE, SODIUM LACTATE, POTASSIUM CHLORIDE, CALCIUM CHLORIDE 600; 310; 30; 20 MG/100ML; MG/100ML; MG/100ML; MG/100ML
125 INJECTION, SOLUTION INTRAVENOUS CONTINUOUS
OUTPATIENT
Start: 2025-03-03

## 2025-03-03 NOTE — ASSESSMENT & PLAN NOTE
Patient reports that he stopped smoking approximately 2 years ago and this has contributed to weight gain.  Healthy eating and exercise to lose weight

## 2025-03-03 NOTE — PROGRESS NOTES
Name: Fernando Beasley      : 1965      MRN: [id number]  Encounter Provider: GENNA Elias  Encounter Date: 3/3/2025   Encounter department: [facility] GASTROENTEROLOGY [place] [address] [address]  :  Assessment & Plan  Gastroesophageal reflux disease without esophagitis  Patient has history of GERD.  Patient reports GERD symptoms are currently well-controlled on current medication.  Patient denies acid reflux, heartburn, nausea, vomiting, epigastric or abdominal pain.  Orders:    pantoprazole (PROTONIX) 40 mg tablet; Take 1 tablet (40 mg total) by mouth daily  Avoid trigger foods  Class 1 obesity due to excess calories without serious comorbidity with body mass index (BMI) of 33.0 to 33.9 in adult  Patient reports that he stopped smoking approximately 2 years ago and this has contributed to weight gain.  Healthy eating and exercise to lose weight       Hx of colonic polyps  Patient has history of colon polyps.Colonoscopy done on 2020 showed small polyps removed otherwise normal colonoscopy.   Orders:    polyethylene glycol (GOLYTELY) 4000 mL solution; Take 4,000 mL by mouth once for 1 dose Take as directed by office prior to procedure.    Colonoscopy; Future    Primary hypertension  Patient noted to have elevated BP in office 161/100.  Patient is on Norvasc for blood pressure.  Patient reports he rushed here from work and he felt that was contributing to his elevated blood pressure.  Patient denies any lightheadedness dizziness or blurred vision.  Follow up with PCP for elevated blood pressure       Follow up 1 year    History of Present Illness   Abdominal Pain  The onset quality is undetermined. The problem has been resolved. Pertinent negatives include no anorexia, arthralgias, belching, dysuria, fever, flatus, hematochezia, hematuria, melena, vomiting or weight loss. Nothing aggravates the pain. The pain is relieved by Nothing.     Fernando Beasley is a 59 y.o. male who presents for  follow-up for GERD, obesity, and history of colon polyps.  Patient reports that his GERD symptoms are currently well-controlled on pantoprazole 40 mg daily.  Patient denies nausea, vomiting, acid reflux, heartburn, dysphagia, epigastric or abdominal pain.  Patient reports that he quit smoking approximately 2 years ago and since then he has gained weight.  Patient reports he has been trying to lose weight.  Patient denies any change in bowel habits.  Denies stool and blood, blood from rectal area, or black tarry stool.  Abdomen exam benign no abdominal tenderness or guarding.  Reviewed recent lab work.  Hemoglobin 14.3 and white blood count 6.9.  CMP within normal limits except glucose 108.  TSH within normal limits. BP noted to be elevated in office 161/100.  Patient denies any blurred vision, headache, or dizziness.    Colonoscopy done on 7/22/2020 showed small polyps removed otherwise normal colonoscopy.  Patient does not smoke.Colonoscopy done on 7/22/2020 showed small polyps removed otherwise normal colonoscopy.   You    Review of Systems   Constitutional:  Negative for chills, fever and weight loss.   HENT:  Negative for ear pain and sore throat.    Eyes:  Negative for pain and visual disturbance.   Respiratory:  Negative for cough and shortness of breath.    Cardiovascular:  Negative for chest pain and palpitations.   Gastrointestinal:  Negative for abdominal pain, anorexia, flatus, hematochezia, melena and vomiting.   Genitourinary:  Negative for dysuria and hematuria.   Musculoskeletal:  Negative for arthralgias and back pain.   Skin:  Negative for color change and rash.   Neurological:  Negative for seizures and syncope.   All other systems reviewed and are negative.    Medical History Reviewed by provider this encounter:  Tobacco  Allergies  Meds  Problems  Med Hx  Surg Hx  Fam Hx     .  Past Medical History   Past Medical History:   Diagnosis Date    Cellulitis     infection on abd x 2 mos     Dislocation of metatarsal joint of left foot 05/31/2023    GERD (gastroesophageal reflux disease)     Hypertension     Mass of abdomen     right    Ventral incisional hernia 09/18/2018     Past Surgical History:   Procedure Laterality Date    APPENDECTOMY  2008    CYST REMOVAL      chest    TX ESOPHAGOGASTRODUODENOSCOPY TRANSORAL DIAGNOSTIC N/A 8/28/2018    Procedure: ESOPHAGOGASTRODUODENOSCOPY (EGD);  Surgeon: Yaw Hamilton MD;  Location: [facility] GI LAB;  Service: General    TX EXC TUMOR SOFT TISSUE ABDL WALL SUBFASCIAL <5CM Right 9/18/2018    Procedure: EXPLORATION OF WOUND, EXCISION OF LIPOMA R . HERNIA REPAIR OF RIGHT GROIN WITH MESH;  Surgeon: Yaw Hamilton MD;  Location: [facility] MAIN OR;  Service: General     Family History   Problem Relation Age of Onset    Stroke Mother       reports that he quit smoking about 3 years ago. His smoking use included cigarettes. He has a 12.5 pack-year smoking history. He has never used smokeless tobacco. He reports that he does not drink alcohol and does not use drugs.  Current Outpatient Medications   Medication Instructions    amLODIPine (NORVASC) 5 mg, Oral, Daily    baclofen 10 mg, Oral, 2 times daily    Multiple Vitamins-Minerals (MENS 50+ ADVANCED PO) Daily after lunch    pantoprazole (PROTONIX) 40 mg, Oral, Daily    polyethylene glycol (GOLYTELY) 4000 mL solution 4,000 mL, Oral, Once, Take as directed by office prior to procedure.    sildenafil (VIAGRA) 100 mg, Oral, As needed    zolpidem (AMBIEN) 5 mg, Oral, Daily at bedtime PRN   No Known Allergies   Current Outpatient Medications on File Prior to Visit   Medication Sig Dispense Refill    amLODIPine (NORVASC) 5 mg tablet Take 1 tablet (5 mg total) by mouth daily 90 tablet 0    baclofen 10 mg tablet Take 1 tablet (10 mg total) by mouth 2 (two) times a day (Patient taking differently: Take 10 mg by mouth if needed) 30 tablet 0    Multiple Vitamins-Minerals (MENS 50+ ADVANCED PO) Take by mouth daily after lunch       "sildenafil (VIAGRA) 100 mg tablet Take 1 tablet (100 mg total) by mouth as needed for erectile dysfunction 12 tablet 2    zolpidem (AMBIEN) 5 mg tablet Take 1 tablet (5 mg total) by mouth daily at bedtime as needed for sleep 30 tablet 0    [DISCONTINUED] pantoprazole (PROTONIX) 40 mg tablet Take 1 tablet (40 mg total) by mouth daily as needed (GERD) 90 tablet 1     No current facility-administered medications on file prior to visit.      Social History     Tobacco Use    Smoking status: Former     Current packs/day: 0.00     Average packs/day: 0.5 packs/day for 25.0 years (12.5 ttl pk-yrs)     Types: Cigarettes     Quit date: 3/1/2022     Years since quitting: 3.0    Smokeless tobacco: Never   Vaping Use    Vaping status: Never Used   Substance and Sexual Activity    Alcohol use: No    Drug use: No    Sexual activity: Not on file        Objective   /100 (Patient Position: Sitting, Cuff Size: Standard)   Pulse 88   Ht 5' 6\" (1.676 m)   Wt 98.3 kg (216 lb 12.8 oz)   BMI 34.99 kg/m²      Physical Exam  Vitals and nursing note reviewed.   Constitutional:       General: He is not in acute distress.     Appearance: He is well-developed.   HENT:      Head: Normocephalic and atraumatic.   Eyes:      Conjunctiva/sclera: Conjunctivae normal.   Cardiovascular:      Rate and Rhythm: Normal rate and regular rhythm.      Pulses: Normal pulses.      Heart sounds: Normal heart sounds. No murmur heard.  Pulmonary:      Effort: Pulmonary effort is normal. No respiratory distress.      Breath sounds: Normal breath sounds. No stridor. No wheezing, rhonchi or rales.   Abdominal:      General: Bowel sounds are normal. There is no distension.      Palpations: Abdomen is soft. There is no mass.      Tenderness: There is no abdominal tenderness. There is no guarding or rebound.   Musculoskeletal:         General: No swelling.      Cervical back: Neck supple.      Right lower leg: No edema.      Left lower leg: No edema.   Skin:    "  General: Skin is warm and dry.      Capillary Refill: Capillary refill takes less than 2 seconds.      Coloration: Skin is not jaundiced or pale.   Neurological:      Mental Status: He is alert and oriented to person, place, and time.   Psychiatric:         Mood and Affect: Mood normal.

## 2025-03-03 NOTE — ASSESSMENT & PLAN NOTE
Patient has history of GERD.  Patient reports GERD symptoms are currently well-controlled on current medication.  Patient denies acid reflux, heartburn, nausea, vomiting, epigastric or abdominal pain.  Orders:    pantoprazole (PROTONIX) 40 mg tablet; Take 1 tablet (40 mg total) by mouth daily  Avoid trigger foods

## 2025-03-03 NOTE — ASSESSMENT & PLAN NOTE
Patient noted to have elevated BP in office 161/100.  Patient is on Norvasc for blood pressure.  Patient reports he rushed here from work and he felt that was contributing to his elevated blood pressure.  Patient denies any lightheadedness dizziness or blurred vision.  Follow up with PCP for elevated blood pressure

## 2025-03-03 NOTE — ASSESSMENT & PLAN NOTE
Patient has history of colon polyps.Colonoscopy done on 7/22/2020 showed small polyps removed otherwise normal colonoscopy.   Orders:    polyethylene glycol (GOLYTELY) 4000 mL solution; Take 4,000 mL by mouth once for 1 dose Take as directed by office prior to procedure.    Colonoscopy; Future

## 2025-03-03 NOTE — TELEPHONE ENCOUNTER
Scheduled date of colonoscopy (as of today): 7/28/25  Physician performing colonoscopy: Dr Rendon  Location of colonoscopy:   Bowel prep reviewed with patient: Golytely given at appt   Instructions reviewed with patient by: ls  Clearances: n/a

## 2025-03-25 NOTE — PROGRESS NOTES
Adult Annual Physical  Name: Fernando Beasley      : 1965      MRN: [id number]  Encounter Provider: Jessica Fatima MD  Encounter Date: 3/26/2025   Encounter department: [facility]    Assessment & Plan    Preventive Screenings:    - Prostate cancer screening: screening up-to-date     Immunizations:  - Immunizations due: Influenza, Tdap and Zoster (Shingrix)         History of Present Illness   {?Quick Links Encounters * My Last Note * Last Note in Specialty * Snapshot * Since Last Visit * History :84406}  Adult Annual Physical  Review of Systems  {Select to Display PMH (Optional):87107}    Objective {?Quick Links Trend Vitals * Enter New Vitals * Results Review * Timeline (Adult) * Labs * Imaging * Cardiology * Procedures * Lung Cancer Screening * Surgical eConsent :84290}  There were no vitals taken for this visit.    Physical Exam  {Administrative / Billing Section (Optional):44300}

## 2025-03-26 ENCOUNTER — OFFICE VISIT (OUTPATIENT)
Dept: FAMILY MEDICINE CLINIC | Facility: CLINIC | Age: 60
End: 2025-03-26
Payer: COMMERCIAL

## 2025-03-26 VITALS
WEIGHT: 217 LBS | HEART RATE: 93 BPM | DIASTOLIC BLOOD PRESSURE: 80 MMHG | SYSTOLIC BLOOD PRESSURE: 134 MMHG | HEIGHT: 66 IN | OXYGEN SATURATION: 96 % | BODY MASS INDEX: 34.87 KG/M2

## 2025-03-26 DIAGNOSIS — E66.09 CLASS 1 OBESITY DUE TO EXCESS CALORIES WITHOUT SERIOUS COMORBIDITY WITH BODY MASS INDEX (BMI) OF 33.0 TO 33.9 IN ADULT: ICD-10-CM

## 2025-03-26 DIAGNOSIS — N52.9 ERECTILE DYSFUNCTION, UNSPECIFIED ERECTILE DYSFUNCTION TYPE: ICD-10-CM

## 2025-03-26 DIAGNOSIS — R06.83 SNORING: ICD-10-CM

## 2025-03-26 DIAGNOSIS — R79.89 LOW TSH LEVEL: ICD-10-CM

## 2025-03-26 DIAGNOSIS — K21.9 GASTROESOPHAGEAL REFLUX DISEASE WITHOUT ESOPHAGITIS: ICD-10-CM

## 2025-03-26 DIAGNOSIS — E11.9 TYPE 2 DIABETES MELLITUS WITHOUT COMPLICATION, WITHOUT LONG-TERM CURRENT USE OF INSULIN (HCC): Primary | ICD-10-CM

## 2025-03-26 DIAGNOSIS — E66.811 CLASS 1 OBESITY DUE TO EXCESS CALORIES WITHOUT SERIOUS COMORBIDITY WITH BODY MASS INDEX (BMI) OF 33.0 TO 33.9 IN ADULT: ICD-10-CM

## 2025-03-26 DIAGNOSIS — Z00.00 ANNUAL PHYSICAL EXAM: ICD-10-CM

## 2025-03-26 DIAGNOSIS — I10 PRIMARY HYPERTENSION: ICD-10-CM

## 2025-03-26 PROCEDURE — 99396 PREV VISIT EST AGE 40-64: CPT | Performed by: INTERNAL MEDICINE

## 2025-03-26 PROCEDURE — 99214 OFFICE O/P EST MOD 30 MIN: CPT | Performed by: INTERNAL MEDICINE

## 2025-03-26 RX ORDER — METFORMIN HYDROCHLORIDE 500 MG/1
500 TABLET, EXTENDED RELEASE ORAL
Qty: 90 TABLET | Refills: 0 | Status: SHIPPED | OUTPATIENT
Start: 2025-03-26

## 2025-03-26 NOTE — ASSESSMENT & PLAN NOTE
PRE-OP EVALUATION    Patient Name: Tim Bennett    Pre-op Diagnosis: Diarrhea, unspecified type [R19.7]  History of colon polyps [Z86.010]    Procedure(s):  COLONOSCOPY    Surgeon(s) and Role:     Turner Rubalcava MD - Primary    Pre-op vitals reviewe Repeat TSH level came back normal 0.483 will monitor with periodic labs ultrasound was normal.      Cholecalciferol (VITAMIN D) 2000 UNITS Oral Tab Take 4,000 Units by mouth. Disp:  Rfl:    Calcium 500-125 MG-UNIT Oral Tab Take  by mouth. Disp:  Rfl:    vitamin E 400 UNITS Oral Cap Take 1,000 Units by mouth daily.  Disp:  Rfl:    Ascorbic Acid (VITAMIN Drinks per session: 1 or 2      Binge frequency: Never      Comment: 1 DRINK DAILY      Drug use: No     Available pre-op labs reviewed.   Lab Results   Component Value Date    WBC 9.79 08/01/2019    RBC 4.63 08/01/2019    HGB 14.3 08/01/2019    HCT 42.8 08

## 2025-03-26 NOTE — ASSESSMENT & PLAN NOTE
Patient's wife has noticed him snoring a lot in the recent past not sure about the apneic spells patient has not complained of any tight feeling in the morning.  Patient's wife is going to monitor most likely he will need his study for sleep apnea I discussed with patient about the same also he wants to wait.

## 2025-03-26 NOTE — ASSESSMENT & PLAN NOTE
Current weight is 217 pounds.  Patient has recently gained weight he has been not following the diet emphasized the need for diet exercise lifestyle modification he promised to lose weight 10 pounds in next couple of months time discussed with patient regarding the diet also cutting back on carbohydrate intake.

## 2025-03-26 NOTE — ASSESSMENT & PLAN NOTE
Lab Results   Component Value Date    HGBA1C 6.5 (H) 03/01/2025   Patient's hemoglobin A1c came at 6.5 in the diabetic range we will start the patient on metformin 5 mg daily discussed with patient at length regarding diet exercise lifestyle modification although sugar containing products were discussed with patient at length.  Patient at present time wants to do everything on his own with regards to diet does not want any dietary consultation.  Very strongly recommended to do exercise at least 20 to 30 minutes every day    Orders:  •  metFORMIN (GLUCOPHAGE-XR) 500 mg 24 hr tablet; Take 1 tablet (500 mg total) by mouth daily with dinner

## 2025-03-26 NOTE — ASSESSMENT & PLAN NOTE
Patient with current symptoms currently taking pantoprazole 40 mg daily recommend very strongly not to eat late in the night keep the head of the bed up as much as possible while in the bed.      no

## 2025-03-26 NOTE — PROGRESS NOTES
Adult Annual Physical  Name: Fernando Beasley      : 1965      MRN: [id number]  Encounter Provider: Jessica Fatima MD  Encounter Date: 3/26/2025   Encounter department: [facility] PRIMARY CARE [place]    Assessment & Plan  Type 2 diabetes mellitus without complication, without long-term current use of insulin (Formerly [facility])    Lab Results   Component Value Date    HGBA1C 6.5 (H) 2025   Patient's hemoglobin A1c came at 6.5 in the diabetic range we will start the patient on metformin 5 mg daily discussed with patient at length regarding diet exercise lifestyle modification although sugar containing products were discussed with patient at length.  Patient at present time wants to do everything on his own with regards to diet does not want any dietary consultation.  Very strongly recommended to do exercise at least 20 to 30 minutes every day    Orders:  •  metFORMIN (GLUCOPHAGE-XR) 500 mg 24 hr tablet; Take 1 tablet (500 mg total) by mouth daily with dinner    Class 1 obesity due to excess calories without serious comorbidity with body mass index (BMI) of 33.0 to 33.9 in adult      Current weight is 217 pounds.  Patient has recently gained weight he has been not following the diet emphasized the need for diet exercise lifestyle modification he promised to lose weight 10 pounds in next couple of months time discussed with patient regarding the diet also cutting back on carbohydrate intake.       Primary hypertension  Patient blood pressure today is 134/80 continue with current medication amlodipine 5 mg daily       Snoring  Patient's wife has noticed him snoring a lot in the recent past not sure about the apneic spells patient has not complained of any tight feeling in the morning.  Patient's wife is going to monitor most likely he will need his study for sleep apnea I discussed with patient about the same also he wants to wait.       Erectile dysfunction, unspecified erectile dysfunction type  Continue sildenafil        Gastroesophageal reflux disease without esophagitis  Patient with current symptoms currently taking pantoprazole 40 mg daily recommend very strongly not to eat late in the night keep the head of the bed up as much as possible while in the bed.       Low TSH level  Repeat TSH level came back normal 0.483 will monitor with periodic labs ultrasound was normal.       Annual physical exam         Preventive Screenings:  - Diabetes Screening: screening up-to-date  - Cholesterol Screening: screening up-to-date   - Hepatitis C screening: screening not indicated   - HIV screening: screening not indicated   - Colon cancer screening: screening up-to-date   - Lung cancer screening: screening not indicated   - Prostate cancer screening: screening up-to-date     Immunizations:  - Immunizations due: Influenza, Tdap and Zoster (Shingrix)    Counseling/Anticipatory Guidance:  - Alcohol: discussed moderation in alcohol intake and recommendations for healthy alcohol use.   - Drug use: discussed harms of illicit drug use and how it can negatively impact mental/physical health.   - Tobacco use: discussed harms of tobacco use and management options for quitting.   - Dental health: discussed importance of regular tooth brushing, flossing, and dental visits.   - Sexual health: discussed sexually transmitted diseases, partner selection, use of condoms, avoidance of unintended pregnancy, and contraceptive alternatives.   - Diet: discussed recommendations for a healthy/well-balanced diet.   - Exercise: the importance of regular exercise/physical activity was discussed. Recommend exercise 3-5 times per week for at least 30 minutes.   - Injury prevention: discussed safety/seat belts, safety helmets, smoke detectors, carbon monoxide detectors, and smoking near bedding or upholstery.       Depression Screening and Follow-up Plan: Patient was screened for depression during today's encounter. They screened negative with a PHQ-2 score of  "0.          History of Present Illness     Adult Annual Physical:  Patient presents for annual physical.     Diet and Physical Activity:  - Diet/Nutrition: no special diet and frequent junk food.  - Exercise: walking and 5-7 times a week on average.    Depression Screening:  - PHQ-2 Score: 0    General Health:  - Sleep: sleeps well.  - Hearing: normal hearing right ear and normal hearing left ear.  - Vision: wears glasses and most recent eye exam > 1 year ago. Reading glasses  - Dental: regular dental visits and no dental visits for > 1 year.     Health:  - History of STDs: no.   - Urinary symptoms: erectile dysfunction.     Advanced Care Planning:  - Has an advanced directive?: no    - Has a durable medical POA?: no    - ACP document given to patient?: no      Review of Systems   Constitutional:  Negative for chills and fever.   HENT:  Negative for ear pain and sore throat.    Eyes:  Negative for pain and visual disturbance.   Respiratory:  Negative for cough and shortness of breath.    Cardiovascular:  Negative for chest pain and palpitations.   Gastrointestinal:  Negative for abdominal pain and vomiting.   Genitourinary:  Negative for dysuria and hematuria.   Musculoskeletal:  Negative for arthralgias and back pain.   Skin:  Negative for color change and rash.   Neurological:  Negative for seizures and syncope.   All other systems reviewed and are negative.        Objective   /80 (BP Location: Right arm, Patient Position: Sitting, Cuff Size: Standard)   Pulse 93   Ht 5' 6\" (1.676 m)   Wt 98.4 kg (217 lb)   SpO2 96%   BMI 35.02 kg/m²     Physical Exam  Vitals and nursing note reviewed.   Constitutional:       General: He is not in acute distress.     Appearance: He is well-developed. He is obese.   HENT:      Head: Normocephalic and atraumatic.   Eyes:      Conjunctiva/sclera: Conjunctivae normal.   Cardiovascular:      Rate and Rhythm: Normal rate and regular rhythm.      Heart sounds: No murmur " heard.  Pulmonary:      Effort: Pulmonary effort is normal. No respiratory distress.      Breath sounds: Normal breath sounds.   Abdominal:      Palpations: Abdomen is soft.      Tenderness: There is no abdominal tenderness.   Musculoskeletal:         General: No swelling.      Cervical back: Neck supple.   Skin:     General: Skin is warm and dry.      Capillary Refill: Capillary refill takes less than 2 seconds.   Neurological:      Mental Status: He is alert.   Psychiatric:         Mood and Affect: Mood normal.

## 2025-03-26 NOTE — PATIENT INSTRUCTIONS
"Patient Education     Routine physical for adults   The Basics   Written by the doctors and editors at [facility]   What is a physical? -- A physical is a routine visit, or \"check-up,\" with your doctor. You might also hear it called a \"wellness visit\" or \"preventive visit.\"  During each visit, the doctor will:   Ask about your physical and mental health   Ask about your habits, behaviors, and lifestyle   Do an exam   Give you vaccines if needed   Talk to you about any medicines you take   Give advice about your health   Answer your questions  Getting regular check-ups is an important part of taking care of your health. It can help your doctor find and treat any problems you have. But it's also important for preventing health problems.  A routine physical is different from a \"sick visit.\" A sick visit is when you see a doctor because of a health concern or problem. Since physicals are scheduled ahead of time, you can think about what you want to ask the doctor.  How often should I get a physical? -- It depends on your age and health. In general, for people age 21 years and older:   If you are younger than 50 years, you might be able to get a physical every 3 years.   If you are 50 years or older, your doctor might recommend a physical every year.  If you have an ongoing health condition, like diabetes or high blood pressure, your doctor will probably want to see you more often.  What happens during a physical? -- In general, each visit will include:   Physical exam - The doctor or nurse will check your height, weight, heart rate, and blood pressure. They will also look at your eyes and ears. They will ask about how you are feeling and whether you have any symptoms that bother you.   Medicines - It's a good idea to bring a list of all the medicines you take to each doctor visit. Your doctor will talk to you about your medicines and answer any questions. Tell them if you are having any side effects that bother you. You " "should also tell them if you are having trouble paying for any of your medicines.   Habits and behaviors - This includes:   Your diet   Your exercise habits   Whether you smoke, drink alcohol, or use drugs   Whether you are sexually active   Whether you feel safe at home  Your doctor will talk to you about things you can do to improve your health and lower your risk of health problems. They will also offer help and support. For example, if you want to quit smoking, they can give you advice and might prescribe medicines. If you want to improve your diet or get more physical activity, they can help you with this, too.   Lab tests, if needed - The tests you get will depend on your age and situation. For example, your doctor might want to check your:   Cholesterol   Blood sugar   Iron level   Vaccines - The recommended vaccines will depend on your age, health, and what vaccines you already had. Vaccines are very important because they can prevent certain serious or deadly infections.   Discussion of screening - \"Screening\" means checking for diseases or other health problems before they cause symptoms. Your doctor can recommend screening based on your age, risk, and preferences. This might include tests to check for:   Cancer, such as breast, prostate, cervical, ovarian, colorectal, prostate, lung, or skin cancer   Sexually transmitted infections, such as chlamydia and gonorrhea   Mental health conditions like depression and anxiety  Your doctor will talk to you about the different types of screening tests. They can help you decide which screenings to have. They can also explain what the results might mean.   Answering questions - The physical is a good time to ask the doctor or nurse questions about your health. If needed, they can refer you to other doctors or specialists, too.  Adults older than 65 years often need other care, too. As you get older, your doctor will talk to you about:   How to prevent falling at " home   Hearing or vision tests   Memory testing   How to take your medicines safely   Making sure that you have the help and support you need at home  All topics are updated as new evidence becomes available and our peer review process is complete.  This topic retrieved from [employer] on: May 02, 2024.  Topic 447946 Version 1.0  Release: 32.4.3 - C32.122  © 2024 UpToDate, Inc. and/or its affiliates. All rights reserved.  Consumer Information Use and Disclaimer   Disclaimer: This generalized information is a limited summary of diagnosis, treatment, and/or medication information. It is not meant to be comprehensive and should be used as a tool to help the user understand and/or assess potential diagnostic and treatment options. It does NOT include all information about conditions, treatments, medications, side effects, or risks that may apply to a specific patient. It is not intended to be medical advice or a substitute for the medical advice, diagnosis, or treatment of a health care provider based on the health care provider's examination and assessment of a patient's specific and unique circumstances. Patients must speak with a health care provider for complete information about their health, medical questions, and treatment options, including any risks or benefits regarding use of medications. This information does not endorse any treatments or medications as safe, effective, or approved for treating a specific patient. UpToDate, Inc. and its affiliates disclaim any warranty or liability relating to this information or the use thereof.The use of this information is governed by the Terms of Use, available at https://www.woltersBrightstormuwer.com/en/know/clinical-effectiveness-terms. 2024© UpToDate, Inc. and its affiliates and/or licensors. All rights reserved.  Copyright   © 2024 UpToDate, Inc. and/or its affiliates. All rights reserved.

## 2025-04-03 DIAGNOSIS — I10 PRIMARY HYPERTENSION: ICD-10-CM

## 2025-04-04 RX ORDER — AMLODIPINE BESYLATE 5 MG/1
5 TABLET ORAL DAILY
Qty: 90 TABLET | Refills: 1 | Status: SHIPPED | OUTPATIENT
Start: 2025-04-04

## 2025-05-21 ENCOUNTER — RA CDI HCC (OUTPATIENT)
Dept: OTHER | Facility: HOSPITAL | Age: 60
End: 2025-05-21

## 2025-05-21 NOTE — PROGRESS NOTES
HCC coding opportunities          Chart Reviewed number of suggestions sent to Provider: 1  E11.22     Patients Insurance        Commercial Insurance: [employer]

## 2025-05-29 ENCOUNTER — OFFICE VISIT (OUTPATIENT)
Dept: FAMILY MEDICINE CLINIC | Facility: CLINIC | Age: 60
End: 2025-05-29
Payer: COMMERCIAL

## 2025-05-29 VITALS
HEART RATE: 93 BPM | OXYGEN SATURATION: 98 % | WEIGHT: 213.2 LBS | SYSTOLIC BLOOD PRESSURE: 127 MMHG | BODY MASS INDEX: 34.27 KG/M2 | HEIGHT: 66 IN | DIASTOLIC BLOOD PRESSURE: 76 MMHG

## 2025-05-29 DIAGNOSIS — R73.03 PREDIABETES: ICD-10-CM

## 2025-05-29 DIAGNOSIS — E66.811 CLASS 1 OBESITY DUE TO EXCESS CALORIES WITHOUT SERIOUS COMORBIDITY WITH BODY MASS INDEX (BMI) OF 34.0 TO 34.9 IN ADULT: ICD-10-CM

## 2025-05-29 DIAGNOSIS — R06.83 SNORING: ICD-10-CM

## 2025-05-29 DIAGNOSIS — E66.09 CLASS 1 OBESITY DUE TO EXCESS CALORIES WITHOUT SERIOUS COMORBIDITY WITH BODY MASS INDEX (BMI) OF 34.0 TO 34.9 IN ADULT: ICD-10-CM

## 2025-05-29 DIAGNOSIS — N39.0 ACUTE UTI: ICD-10-CM

## 2025-05-29 DIAGNOSIS — E11.9 TYPE 2 DIABETES MELLITUS WITHOUT COMPLICATION, WITHOUT LONG-TERM CURRENT USE OF INSULIN (HCC): Primary | ICD-10-CM

## 2025-05-29 DIAGNOSIS — R79.89 LOW TSH LEVEL: ICD-10-CM

## 2025-05-29 DIAGNOSIS — K21.9 GASTROESOPHAGEAL REFLUX DISEASE WITHOUT ESOPHAGITIS: ICD-10-CM

## 2025-05-29 DIAGNOSIS — F51.01 PRIMARY INSOMNIA: ICD-10-CM

## 2025-05-29 DIAGNOSIS — I10 PRIMARY HYPERTENSION: ICD-10-CM

## 2025-05-29 PROCEDURE — 99214 OFFICE O/P EST MOD 30 MIN: CPT | Performed by: INTERNAL MEDICINE

## 2025-05-29 RX ORDER — METFORMIN HYDROCHLORIDE 500 MG/1
500 TABLET, EXTENDED RELEASE ORAL
Qty: 90 TABLET | Refills: 0 | Status: SHIPPED | OUTPATIENT
Start: 2025-05-29

## 2025-05-29 NOTE — ASSESSMENT & PLAN NOTE
Patient's weight is today 213 pounds he has lost about 4 pounds since last visit BMI is 34.41 emphasized regarding diet exercise lifestyle modification cutting back on carbohydrate intake and follow-up.

## 2025-05-29 NOTE — ASSESSMENT & PLAN NOTE
Patient symptoms suggestive of sleep apnea however he wants to wait for sleep study to be done he wants to wait for his wife to come back was away for past few weeks

## 2025-05-29 NOTE — ASSESSMENT & PLAN NOTE
Patient currently taking pantoprazole 40 mg daily  With responding not to be taking the night and keep the LDL elevated up as much as possible.

## 2025-05-29 NOTE — PROGRESS NOTES
Name: Fernando Beasley      : 1965      MRN: [id number]  Encounter Provider: Jessica Fatima MD  Encounter Date: 2025   Encounter department: [facility]  :  Assessment & Plan  Type 2 diabetes mellitus without complication, without long-term current use of insulin (HCC)    Lab Results   Component Value Date    HGBA1C 6.5 (H) 2025   Patient tolerating the metformin very well last A1c 6.5 currently on metformin 5 mg daily with dinner follow-up with repeat lab including A1c and albumin creatinine ratio based on that we will make decisions patient at present time refusing to see the dietitian he does not want to do the blood test at home also.  He wants to wait until his wife comes back  Orders:  •  metFORMIN (GLUCOPHAGE-XR) 500 mg 24 hr tablet; Take 1 tablet (500 mg total) by mouth daily with dinner  •  Albumin / creatinine urine ratio; Future  •  Hemoglobin A1C; Future    Class 1 obesity due to excess calories without serious comorbidity with body mass index (BMI) of 34.0 to 34.9 in adult  Patient's weight is today 213 pounds he has lost about 4 pounds since last visit BMI is 34.41 emphasized regarding diet exercise lifestyle modification cutting back on carbohydrate intake and follow-up.         Primary hypertension  Blood pressure today 127/76 currently patient is on amlodipine 5 mg daily continue       Gastroesophageal reflux disease without esophagitis  Patient currently taking pantoprazole 40 mg daily  With responding not to be taking the night and keep the LDL elevated up as much as possible.       Low TSH level  Follow-up with repeat TSH level       Primary insomnia  Patient occasionally takes Ambien to help with sleeping       Snoring  Patient symptoms suggestive of sleep apnea however he wants to wait for sleep study to be done he wants to wait for his wife to come back was away for past few weeks       Prediabetes    Orders:  •  Comprehensive metabolic panel;  "Future    Acute UTI    Orders:  •  UA/M w/rflx Culture, Comp; Future           History of Present Illness   Patient is coming here for a follow-up evaluation with regards to his symptoms of diabetes, hypertension, obesity and also history of snoring.  Last hemoglobin A1c was 6.5 patient has been started on metformin and was recommended to follow strict diet.  Denies chest pain palpitation shortness of breath.  No nausea vomiting abdominal pain.  Tolerating metformin very well.    Patient at present time does not want to see the dietitian and also does not want to do blood sugar checks at home.  His wife is away for few weeks when she comes back he thinks he will follow even more strict diet.      Review of Systems   Constitutional:  Negative for chills and fever.   HENT:  Negative for ear pain and sore throat.    Eyes:  Negative for pain and visual disturbance.   Respiratory:  Negative for cough and shortness of breath.    Cardiovascular:  Negative for chest pain and palpitations.   Gastrointestinal:  Negative for abdominal pain and vomiting.   Genitourinary:  Negative for dysuria and hematuria.   Musculoskeletal:  Negative for arthralgias and back pain.   Skin:  Negative for color change and rash.   Neurological:  Negative for seizures and syncope.   All other systems reviewed and are negative.      Objective   /76 (BP Location: Right arm, Patient Position: Sitting, Cuff Size: Standard)   Pulse 93   Ht 5' 6\" (1.676 m)   Wt 96.7 kg (213 lb 3.2 oz)   SpO2 98%   BMI 34.41 kg/m²      Physical Exam  Vitals and nursing note reviewed.   Constitutional:       General: He is not in acute distress.     Appearance: He is well-developed. He is obese.   HENT:      Head: Normocephalic and atraumatic.     Eyes:      Conjunctiva/sclera: Conjunctivae normal.       Cardiovascular:      Rate and Rhythm: Normal rate and regular rhythm.      Pulses: no weak pulses.           Dorsalis pedis pulses are 1+ on the right side and " 1+ on the left side.        Posterior tibial pulses are 1+ on the right side and 1+ on the left side.      Heart sounds: No murmur heard.  Pulmonary:      Effort: Pulmonary effort is normal. No respiratory distress.      Breath sounds: Normal breath sounds.   Abdominal:      Palpations: Abdomen is soft.      Tenderness: There is no abdominal tenderness.     Musculoskeletal:         General: No swelling.      Cervical back: Neck supple.   Feet:      Right foot:      Skin integrity: No ulcer, skin breakdown, erythema, warmth, callus or dry skin.      Left foot:      Skin integrity: No ulcer, skin breakdown, erythema, warmth, callus or dry skin.     Skin:     General: Skin is warm and dry.      Capillary Refill: Capillary refill takes less than 2 seconds.     Neurological:      Mental Status: He is alert.     Psychiatric:         Mood and Affect: Mood normal.       Diabetic Foot Exam    Patient's shoes and socks removed.    Right Foot/Ankle   Right Foot Inspection  Skin Exam: skin normal and skin intact. No dry skin, no warmth, no callus, no erythema, no maceration, no abnormal color, no pre-ulcer, no ulcer and no callus.     Toe Exam: ROM and strength within normal limits.     Sensory   Monofilament testing: intact    Vascular  The right DP pulse is 1+. The right PT pulse is 1+.     Left Foot/Ankle  Left Foot Inspection  Skin Exam: skin normal and skin intact. No dry skin, no warmth, no erythema, no maceration, normal color, no pre-ulcer, no ulcer and no callus.     Toe Exam: ROM and strength within normal limits.     Sensory   Monofilament testing: intact    Vascular  The left DP pulse is 1+. The left PT pulse is 1+.     Assign Risk Category  No deformity present  No loss of protective sensation  No weak pulses  Risk: 0

## 2025-08-10 ENCOUNTER — ANESTHESIA EVENT (OUTPATIENT)
Dept: ANESTHESIOLOGY | Facility: HOSPITAL | Age: 60
End: 2025-08-10

## 2025-08-10 ENCOUNTER — ANESTHESIA (OUTPATIENT)
Dept: ANESTHESIOLOGY | Facility: HOSPITAL | Age: 60
End: 2025-08-10

## 2025-08-11 LAB
ALBUMIN SERPL-MCNC: 4.4 G/DL (ref 3.9–4.9)
ALBUMIN/CREAT UR: <2 MG/G CREAT (ref 0–29)
ALP SERPL-CCNC: 93 IU/L (ref 44–121)
ALT SERPL-CCNC: 40 IU/L (ref 0–44)
APPEARANCE UR: CLEAR
AST SERPL-CCNC: 23 IU/L (ref 0–40)
BACTERIA URNS QL MICRO: NORMAL
BILIRUB SERPL-MCNC: 0.7 MG/DL (ref 0–1.2)
BILIRUB UR QL STRIP: NEGATIVE
BUN SERPL-MCNC: 13 MG/DL (ref 8–27)
BUN/CREAT SERPL: 11 (ref 10–24)
CALCIUM SERPL-MCNC: 9.7 MG/DL (ref 8.6–10.2)
CASTS URNS QL MICRO: NORMAL /LPF
CHLORIDE SERPL-SCNC: 100 MMOL/L (ref 96–106)
CO2 SERPL-SCNC: 27 MMOL/L (ref 20–29)
COLOR UR: YELLOW
CREAT SERPL-MCNC: 1.15 MG/DL (ref 0.76–1.27)
CREAT UR-MCNC: 131.6 MG/DL
EGFR: 72 ML/MIN/1.73
EPI CELLS #/AREA URNS HPF: NORMAL /HPF (ref 0–10)
EST. AVERAGE GLUCOSE BLD GHB EST-MCNC: 126 MG/DL
GLOBULIN SER-MCNC: 2.9 G/DL (ref 1.5–4.5)
GLUCOSE SERPL-MCNC: 99 MG/DL (ref 70–99)
GLUCOSE UR QL: NEGATIVE
HBA1C MFR BLD: 6 % (ref 4.8–5.6)
HGB UR QL STRIP: NEGATIVE
KETONES UR QL STRIP: NEGATIVE
LEUKOCYTE ESTERASE UR QL STRIP: NEGATIVE
MICRO URNS: NORMAL
MICRO URNS: NORMAL
MICROALBUMIN UR-MCNC: <3 UG/ML
NITRITE UR QL STRIP: NEGATIVE
PH UR STRIP: 7.5 [PH] (ref 5–7.5)
POTASSIUM SERPL-SCNC: 4.6 MMOL/L (ref 3.5–5.2)
PROT SERPL-MCNC: 7.3 G/DL (ref 6–8.5)
PROT UR QL STRIP: NEGATIVE
RBC #/AREA URNS HPF: NORMAL /HPF (ref 0–2)
SL AMB URINALYSIS REFLEX: NORMAL
SODIUM SERPL-SCNC: 141 MMOL/L (ref 134–144)
SP GR UR: 1.02 (ref 1–1.03)
UROBILINOGEN UR STRIP-ACNC: 0.2 MG/DL (ref 0.2–1)
WBC #/AREA URNS HPF: NORMAL /HPF (ref 0–5)

## 2025-08-18 ENCOUNTER — OFFICE VISIT (OUTPATIENT)
Age: 60
End: 2025-08-18
Payer: COMMERCIAL

## 2025-08-18 VITALS
OXYGEN SATURATION: 96 % | WEIGHT: 206.7 LBS | DIASTOLIC BLOOD PRESSURE: 77 MMHG | BODY MASS INDEX: 33.22 KG/M2 | HEART RATE: 92 BPM | SYSTOLIC BLOOD PRESSURE: 124 MMHG | HEIGHT: 66 IN

## 2025-08-18 DIAGNOSIS — R73.03 PREDIABETES: ICD-10-CM

## 2025-08-18 DIAGNOSIS — I10 PRIMARY HYPERTENSION: ICD-10-CM

## 2025-08-18 DIAGNOSIS — N52.9 ERECTILE DYSFUNCTION, UNSPECIFIED ERECTILE DYSFUNCTION TYPE: ICD-10-CM

## 2025-08-18 DIAGNOSIS — M54.50 ACUTE RIGHT-SIDED LOW BACK PAIN WITHOUT SCIATICA: ICD-10-CM

## 2025-08-18 DIAGNOSIS — F51.01 PRIMARY INSOMNIA: ICD-10-CM

## 2025-08-18 DIAGNOSIS — E66.09 CLASS 1 OBESITY DUE TO EXCESS CALORIES WITHOUT SERIOUS COMORBIDITY WITH BODY MASS INDEX (BMI) OF 34.0 TO 34.9 IN ADULT: ICD-10-CM

## 2025-08-18 DIAGNOSIS — K21.9 GASTROESOPHAGEAL REFLUX DISEASE WITHOUT ESOPHAGITIS: ICD-10-CM

## 2025-08-18 DIAGNOSIS — E66.811 CLASS 1 OBESITY DUE TO EXCESS CALORIES WITHOUT SERIOUS COMORBIDITY WITH BODY MASS INDEX (BMI) OF 34.0 TO 34.9 IN ADULT: ICD-10-CM

## 2025-08-18 DIAGNOSIS — E11.9 TYPE 2 DIABETES MELLITUS WITHOUT COMPLICATION, WITHOUT LONG-TERM CURRENT USE OF INSULIN (HCC): Primary | ICD-10-CM

## 2025-08-18 PROCEDURE — 99214 OFFICE O/P EST MOD 30 MIN: CPT | Performed by: INTERNAL MEDICINE

## 2025-08-18 RX ORDER — BACLOFEN 10 MG/1
10 TABLET ORAL 2 TIMES DAILY
Qty: 30 TABLET | Refills: 0 | Status: SHIPPED | OUTPATIENT
Start: 2025-08-18

## 2025-08-18 RX ORDER — METFORMIN HYDROCHLORIDE 500 MG/1
500 TABLET, EXTENDED RELEASE ORAL
Qty: 90 TABLET | Refills: 0 | Status: SHIPPED | OUTPATIENT
Start: 2025-08-18

## 2025-08-18 RX ORDER — ZOLPIDEM TARTRATE 5 MG/1
5 TABLET ORAL
Qty: 30 TABLET | Refills: 0 | Status: SHIPPED | OUTPATIENT
Start: 2025-08-18

## (undated) DEVICE — NEEDLE 25G X 1 1/2

## (undated) DEVICE — LABEL STERILE (RSC) (2-SENSOR CAINE 2-LIDOCAINE 2-HEPARIN)

## (undated) DEVICE — SINGLE-USE BIOPSY FORCEPS: Brand: RADIAL JAW 4

## (undated) DEVICE — SUT MONOCRYL 4-0 PC-3 18 IN Y845G

## (undated) DEVICE — SUT VICRYL 3-0 18 IN J904T

## (undated) DEVICE — ASTOUND IMPERVIOUS SURGICAL GOWN: Brand: CONVERTORS

## (undated) DEVICE — SCD SEQUENTIAL COMPRESSION COMFORT SLEEVE MEDIUM KNEE LENGTH: Brand: KENDALL SCD

## (undated) DEVICE — SUT VICRYL 3-0 SH 27 IN J416H

## (undated) DEVICE — CHLORAPREP HI-LITE 26ML ORANGE

## (undated) DEVICE — 3M™ TEGADERM™ TRANSPARENT FILM DRESSING FRAME STYLE, 1627, 4 IN X 10 IN (10 CM X 25 CM), 20/CT 4CT/CASE: Brand: 3M™ TEGADERM™

## (undated) DEVICE — GLOVE INDICATOR PI UNDERGLOVE SZ 7.5 BLUE

## (undated) DEVICE — SYRINGE 10ML LL CONTROL TOP

## (undated) DEVICE — 3M™ STERI-STRIP™ REINFORCED ADHESIVE SKIN CLOSURES, R1547, 1/2 IN X 4 IN (12 MM X 100 MM), 6 STRIPS/ENVELOPE: Brand: 3M™ STERI-STRIP™

## (undated) DEVICE — INTENDED FOR TISSUE SEPARATION, AND OTHER PROCEDURES THAT REQUIRE A SHARP SURGICAL BLADE TO PUNCTURE OR CUT.: Brand: BARD-PARKER SAFETY BLADES SIZE 10, STERILE

## (undated) DEVICE — BASIC DOUBLE BASIN 2-LF: Brand: MEDLINE INDUSTRIES, INC.

## (undated) DEVICE — GAUZE,SPONGE,2"X2",8PLY,STERILE,LF,2'S: Brand: MEDLINE

## (undated) DEVICE — TIBURON LAPAROTOMY DRAPE: Brand: CONVERTORS

## (undated) DEVICE — GLOVE SRG BIOGEL 7

## (undated) DEVICE — PACK GENERAL LF

## (undated) DEVICE — GROUNDING PAD UNIVERSAL SLW

## (undated) DEVICE — INTENDED FOR TISSUE SEPARATION, AND OTHER PROCEDURES THAT REQUIRE A SHARP SURGICAL BLADE TO PUNCTURE OR CUT.: Brand: BARD-PARKER ® CARBON RIB-BACK BLADES